# Patient Record
Sex: FEMALE | Race: WHITE | Employment: UNEMPLOYED | URBAN - METROPOLITAN AREA
[De-identification: names, ages, dates, MRNs, and addresses within clinical notes are randomized per-mention and may not be internally consistent; named-entity substitution may affect disease eponyms.]

---

## 2019-05-06 ENCOUNTER — APPOINTMENT (OUTPATIENT)
Dept: GENERAL RADIOLOGY | Age: 44
End: 2019-05-06
Payer: COMMERCIAL

## 2019-05-06 ENCOUNTER — TELEPHONE (OUTPATIENT)
Dept: PULMONOLOGY | Age: 44
End: 2019-05-06

## 2019-05-06 ENCOUNTER — HOSPITAL ENCOUNTER (EMERGENCY)
Age: 44
Discharge: HOME | End: 2019-05-06
Attending: EMERGENCY MEDICINE
Payer: COMMERCIAL

## 2019-05-06 VITALS
HEART RATE: 61 BPM | WEIGHT: 293 LBS | SYSTOLIC BLOOD PRESSURE: 119 MMHG | RESPIRATION RATE: 20 BRPM | HEIGHT: 72 IN | TEMPERATURE: 97.7 F | DIASTOLIC BLOOD PRESSURE: 77 MMHG | BODY MASS INDEX: 39.68 KG/M2 | OXYGEN SATURATION: 95 %

## 2019-05-06 DIAGNOSIS — R07.89 CHEST TIGHTNESS: ICD-10-CM

## 2019-05-06 DIAGNOSIS — J45.901 REACTIVE AIRWAY DISEASE WITH ACUTE EXACERBATION, UNSPECIFIED ASTHMA SEVERITY, UNSPECIFIED WHETHER PERSISTENT: Primary | ICD-10-CM

## 2019-05-06 DIAGNOSIS — R94.31 NONSPECIFIC ST-T WAVE ELECTROCARDIOGRAPHIC CHANGES: ICD-10-CM

## 2019-05-06 LAB
A/G RATIO: 1.2 (ref 1.1–2.2)
ALBUMIN SERPL-MCNC: 3.6 G/DL (ref 3.4–5)
ALP BLD-CCNC: 60 U/L (ref 40–129)
ALT SERPL-CCNC: 22 U/L (ref 10–40)
ANION GAP SERPL CALCULATED.3IONS-SCNC: 12 MMOL/L (ref 3–16)
AST SERPL-CCNC: 13 U/L (ref 15–37)
BASOPHILS ABSOLUTE: 0.1 K/UL (ref 0–0.2)
BASOPHILS RELATIVE PERCENT: 0.7 %
BILIRUB SERPL-MCNC: <0.2 MG/DL (ref 0–1)
BUN BLDV-MCNC: 15 MG/DL (ref 7–20)
CALCIUM SERPL-MCNC: 8.8 MG/DL (ref 8.3–10.6)
CHLORIDE BLD-SCNC: 100 MMOL/L (ref 99–110)
CO2: 24 MMOL/L (ref 21–32)
CREAT SERPL-MCNC: 1 MG/DL (ref 0.6–1.1)
D DIMER: <200 NG/ML DDU (ref 0–229)
EKG ATRIAL RATE: 65 BPM
EKG DIAGNOSIS: NORMAL
EKG P AXIS: 31 DEGREES
EKG P-R INTERVAL: 144 MS
EKG Q-T INTERVAL: 404 MS
EKG QRS DURATION: 108 MS
EKG QTC CALCULATION (BAZETT): 420 MS
EKG R AXIS: -7 DEGREES
EKG T AXIS: 33 DEGREES
EKG VENTRICULAR RATE: 65 BPM
EOSINOPHILS ABSOLUTE: 0.2 K/UL (ref 0–0.6)
EOSINOPHILS RELATIVE PERCENT: 2.5 %
GFR AFRICAN AMERICAN: >60
GFR NON-AFRICAN AMERICAN: 60
GLOBULIN: 2.9 G/DL
GLUCOSE BLD-MCNC: 117 MG/DL (ref 70–99)
HCG QUALITATIVE: NEGATIVE
HCT VFR BLD CALC: 38.2 % (ref 36–48)
HEMOGLOBIN: 13.4 G/DL (ref 12–16)
LYMPHOCYTES ABSOLUTE: 3 K/UL (ref 1–5.1)
LYMPHOCYTES RELATIVE PERCENT: 30.9 %
MCH RBC QN AUTO: 33 PG (ref 26–34)
MCHC RBC AUTO-ENTMCNC: 35 G/DL (ref 31–36)
MCV RBC AUTO: 94.4 FL (ref 80–100)
MONOCYTES ABSOLUTE: 0.5 K/UL (ref 0–1.3)
MONOCYTES RELATIVE PERCENT: 5 %
NEUTROPHILS ABSOLUTE: 5.9 K/UL (ref 1.7–7.7)
NEUTROPHILS RELATIVE PERCENT: 60.9 %
PDW BLD-RTO: 12.2 % (ref 12.4–15.4)
PLATELET # BLD: 250 K/UL (ref 135–450)
PMV BLD AUTO: 7.8 FL (ref 5–10.5)
POTASSIUM SERPL-SCNC: 3.5 MMOL/L (ref 3.5–5.1)
PRO-BNP: 225 PG/ML (ref 0–124)
RBC # BLD: 4.04 M/UL (ref 4–5.2)
SODIUM BLD-SCNC: 136 MMOL/L (ref 136–145)
TOTAL PROTEIN: 6.5 G/DL (ref 6.4–8.2)
TROPONIN: <0.01 NG/ML
WBC # BLD: 9.7 K/UL (ref 4–11)

## 2019-05-06 PROCEDURE — 84484 ASSAY OF TROPONIN QUANT: CPT

## 2019-05-06 PROCEDURE — 83880 ASSAY OF NATRIURETIC PEPTIDE: CPT

## 2019-05-06 PROCEDURE — 99285 EMERGENCY DEPT VISIT HI MDM: CPT

## 2019-05-06 PROCEDURE — 94640 AIRWAY INHALATION TREATMENT: CPT

## 2019-05-06 PROCEDURE — 71046 X-RAY EXAM CHEST 2 VIEWS: CPT

## 2019-05-06 PROCEDURE — 93010 ELECTROCARDIOGRAM REPORT: CPT | Performed by: INTERNAL MEDICINE

## 2019-05-06 PROCEDURE — 6370000000 HC RX 637 (ALT 250 FOR IP): Performed by: EMERGENCY MEDICINE

## 2019-05-06 PROCEDURE — 96365 THER/PROPH/DIAG IV INF INIT: CPT

## 2019-05-06 PROCEDURE — 93005 ELECTROCARDIOGRAM TRACING: CPT | Performed by: EMERGENCY MEDICINE

## 2019-05-06 PROCEDURE — 84703 CHORIONIC GONADOTROPIN ASSAY: CPT

## 2019-05-06 PROCEDURE — 85025 COMPLETE CBC W/AUTO DIFF WBC: CPT

## 2019-05-06 PROCEDURE — 96375 TX/PRO/DX INJ NEW DRUG ADDON: CPT

## 2019-05-06 PROCEDURE — 6360000002 HC RX W HCPCS: Performed by: EMERGENCY MEDICINE

## 2019-05-06 PROCEDURE — 2500000003 HC RX 250 WO HCPCS: Performed by: EMERGENCY MEDICINE

## 2019-05-06 PROCEDURE — 80053 COMPREHEN METABOLIC PANEL: CPT

## 2019-05-06 PROCEDURE — 85379 FIBRIN DEGRADATION QUANT: CPT

## 2019-05-06 PROCEDURE — 94644 CONT INHLJ TX 1ST HOUR: CPT

## 2019-05-06 RX ORDER — ALBUTEROL SULFATE 90 UG/1
AEROSOL, METERED RESPIRATORY (INHALATION)
Qty: 1 INHALER | Refills: 1 | Status: SHIPPED | OUTPATIENT
Start: 2019-05-06 | End: 2019-07-22 | Stop reason: SDUPTHER

## 2019-05-06 RX ORDER — PREDNISONE 20 MG/1
60 TABLET ORAL ONCE
Status: COMPLETED | OUTPATIENT
Start: 2019-05-06 | End: 2019-05-06

## 2019-05-06 RX ORDER — MAGNESIUM SULFATE 1 G/100ML
1 INJECTION INTRAVENOUS ONCE
Status: COMPLETED | OUTPATIENT
Start: 2019-05-06 | End: 2019-05-06

## 2019-05-06 RX ORDER — IPRATROPIUM BROMIDE AND ALBUTEROL SULFATE 2.5; .5 MG/3ML; MG/3ML
3 SOLUTION RESPIRATORY (INHALATION) ONCE
Status: COMPLETED | OUTPATIENT
Start: 2019-05-06 | End: 2019-05-06

## 2019-05-06 RX ADMIN — FAMOTIDINE 20 MG: 10 INJECTION, SOLUTION INTRAVENOUS at 07:58

## 2019-05-06 RX ADMIN — MAGNESIUM SULFATE HEPTAHYDRATE 1 G: 1 INJECTION, SOLUTION INTRAVENOUS at 07:58

## 2019-05-06 RX ADMIN — IPRATROPIUM BROMIDE AND ALBUTEROL SULFATE 3 AMPULE: .5; 3 SOLUTION RESPIRATORY (INHALATION) at 06:04

## 2019-05-06 RX ADMIN — PREDNISONE 60 MG: 20 TABLET ORAL at 06:46

## 2019-05-06 ASSESSMENT — PAIN SCALES - GENERAL
PAINLEVEL_OUTOF10: 3
PAINLEVEL_OUTOF10: 7
PAINLEVEL_OUTOF10: 4
PAINLEVEL_OUTOF10: 4

## 2019-05-06 ASSESSMENT — PAIN DESCRIPTION - DESCRIPTORS
DESCRIPTORS: TIGHTNESS
DESCRIPTORS: TIGHTNESS

## 2019-05-06 ASSESSMENT — ENCOUNTER SYMPTOMS
COLOR CHANGE: 0
ABDOMINAL PAIN: 0
COUGH: 1
CHEST TIGHTNESS: 1
VOMITING: 1
BACK PAIN: 0
DIARRHEA: 0
WHEEZING: 1
STRIDOR: 0
SHORTNESS OF BREATH: 1
NAUSEA: 1
CONSTIPATION: 0

## 2019-05-06 ASSESSMENT — PAIN DESCRIPTION - LOCATION
LOCATION: CHEST
LOCATION: ABDOMEN
LOCATION: BACK;CHEST
LOCATION: CHEST

## 2019-05-06 ASSESSMENT — PAIN SCALES - WONG BAKER
WONGBAKER_NUMERICALRESPONSE: 4
WONGBAKER_NUMERICALRESPONSE: 4

## 2019-05-06 NOTE — TELEPHONE ENCOUNTER
----- Message from Danie Lawson MD sent at 5/6/2019 11:55 AM EDT -----      ----- Message -----  From: Bucky Siddiqui MD  Sent: 5/6/2019   9:45 AM  To: Danie Lawson MD

## 2019-05-06 NOTE — ED PROVIDER NOTES
[facility]  ED  EMERGENCY DEPARTMENT ENCOUNTER        Pt Name: Brittany Nye  MRN: [id number]  [place] 1975  Date of evaluation: 5/6/2019  Provider: Ac Ahmadi MD  PCP: Trang Mckee Dr       Chief Complaint   Patient presents with    Asthma     started 0130 but was bad last week and was seen  on fri with hhn and better then. didn't get nebulizer on fri, but has rx for it       HISTORY OFPRESENT ILLNESS   (Location/Symptom, Timing/Onset, Context/Setting, Quality, Duration, Modifying Factors,Severity)  Note limiting factors. Brittany Nye is a 38 y.o. female presenting today due to concern for worsening wheezing and cough for the last 4 days after receiving breathing treatments and steroids initially at an urgent care and at that time improving 4 days ago but then last night worsening again to the point where she had trouble breathing. She also states over the last 6 weeks she has gained 30 pounds with increased bilateral leg swelling. Denies any actual chest pain but does have tightness. She does occasionally vomit. No abdominal pain. No fever or chills. She states she has seen multiple specialists for her waking over the last year including endocrinology but is unsure what is causing it, but again the 30 pound weight gain is a significant change over the last few weeks. She does smoke cigarettes but is trying to cut back. Her main concern is her trouble breathing but by the time I saw her she had received breathing treatments and was starting to feel better although still complaining of some chest tightness. Both of her legs are swollen. No history of blood clots. REVIEW OF SYSTEMS    (2-9 systems for level 4, 10 or more for level 5)     Review of Systems   Constitutional: Negative for chills, diaphoresis, fatigue and fever. HENT: Negative for congestion. Respiratory: Positive for cough, chest tightness, shortness of breath and wheezing. Negative for stridor. Cardiovascular: Positive for leg swelling (bilateral). Negative for chest pain. Gastrointestinal: Positive for nausea and vomiting (chronic issue). Negative for abdominal pain, constipation and diarrhea. Genitourinary: Negative for flank pain. Musculoskeletal: Negative for back pain and neck pain. Skin: Negative for color change. Neurological: Negative for weakness, light-headedness, numbness and headaches. Psychiatric/Behavioral: Negative for confusion. Positives and Pertinent negatives as per HPI. PASTMEDICAL HISTORY     Past Medical History:   Diagnosis Date    Asthma     COPD with acute exacerbation ([facility] [address] 6/30/2015    Endometriosis     Fibromyalgia     Gastroparesis     GERD (gastroesophageal reflux disease)     Irritable bowel syndrome     Migraines     Osteoarthritis     Psychiatric problem          SURGICAL HISTORY       Past Surgical History:   Procedure Laterality Date    COLONOSCOPY      DILATATION, ESOPHAGUS      ENDOMETRIAL ABLATION      ENDOSCOPY, COLON, DIAGNOSTIC           CURRENT MEDICATIONS       Discharge Medication List as of 5/6/2019  9:23 AM      CONTINUE these medications which have NOT CHANGED    Details   fluticasone (FLOVENT HFA) 110 MCG/ACT inhaler Inhale 2 puffs into the lungs 2 times daily, Disp-1 Inhaler, R-0      acyclovir (ZOVIRAX) 400 MG tablet Take 400 mg by mouth daily. ALLERGIES     Compazine [prochlorperazine]; Iv dye [iodides]; Toradol [ketorolac tromethamine];  Gluten meal; Milk protein; and Soy protein [place] oil]    FAMILY HISTORY       Family History   Problem Relation Age of Onset    Arthritis Mother     Depression Mother     Mental Illness Mother     Arthritis Father     High Blood Pressure Maternal Grandmother     Cancer Maternal Grandfather     High Blood Pressure Maternal Grandfather     High Blood Pressure Paternal Grandmother     High Blood Pressure Paternal Grandfather SOCIAL HISTORY       Social History     Socioeconomic History    Marital status:      Spouse name: None    Number of children: None    Years of education: None    Highest education level: None   Occupational History    None   Social Needs    Financial resource strain: None    Food insecurity:     Worry: None     Inability: None    Transportation needs:     Medical: None     Non-medical: None   Tobacco Use    Smoking status: Heavy Tobacco Smoker     Packs/day: 1.00     Start date: 6/29/1995    Smokeless tobacco: Never Used   Substance and Sexual Activity    Alcohol use: Yes     Comment: monthly    Drug use: No    Sexual activity: Not Currently     Partners: Male   Lifestyle    Physical activity:     Days per week: None     Minutes per session: None    Stress: None   Relationships    Social connections:     Talks on phone: None     Gets together: None     Attends Hoahaoism service: None     Active member of club or organization: None     Attends meetings of clubs or organizations: None     Relationship status: None    Intimate partner violence:     Fear of current or ex partner: None     Emotionally abused: None     Physically abused: None     Forced sexual activity: None   Other Topics Concern    None   Social History Narrative    None       SCREENINGS                PHYSICAL EXAM    (up to 7 for level 4, 8 or more for level 5)     ED Triage Vitals [05/06/19 0556]   BP Temp Temp Source Pulse Resp SpO2 Height Weight   (!) 157/92 97.7 °F (36.5 °C) Oral 109 18 98 % 6' (1.829 m) (!) 340 lb (154.2 kg)       Physical Exam   Constitutional: She is oriented to person, place, and time. She appears well-developed and well-nourished. She is active and cooperative. Non-toxic appearance. She does not have a sickly appearance. She does not appear ill. No distress. HENT:   Head: Normocephalic and atraumatic. Eyes: Right eye exhibits no discharge. Left eye exhibits no discharge.    Neck: Normal Performed at:  [address], [facility]   Phone [phone]   COMPREHENSIVE METABOLIC PANEL - Abnormal; Notable for the following components:    Glucose 117 (*)     GFR Non- 60 (*)     AST 13 (*)     All other components within normal limits    Narrative:     Performed at:  [address] [place] [address], [facility]   Phone [phone] - Abnormal; Notable for the following components:    Pro- (*)     All other components within normal limits    Narrative:     Performed at:  [address], [facility]   Phone [phone]   TROPONIN    Narrative:     Performed at:  [address] [place] [address], [facility]   Phone [phone]   D-DIMER, QUANTITATIVE    Narrative:     Performed at:  [address] [place] [address], [facility]   Phone [phone]   HCG, SERUM, QUALITATIVE    Narrative:     Performed at:  [address] [place] [address], [facility]   Phone [phone]       All other labs were within normal range or not returned asof this dictation. EKG: All EKG's are interpreted by the Emergency Department Physician who either signs or Co-signs this chart in the absence of a cardiologist.    The Ekg interpreted by me shows  normal sinus rhythm and sinus arrhythmia with a rate of 65  Axis is   Normal  QTc is  normal  Intervals and Durations are unremarkable.       ST Segments: no acute change and nonspecific changes   No significant change from prior EKG dated - 3/12/15  No STEMI, incomplete RBBB present but old, q waves present in anterolateral leads appear new            RADIOLOGY:   Non-plain film images such as CT, Ultrasound and MRI are read by the radiologist. cardiology. She was feeling better while in the emergency department. She also knows to follow up with pulmonology for her asthma versus COPD for further assessment. She understands if she has any worsening trouble breathing, development of chest pain with shortness of breath, other concerning symptoms to return to the emergency department, but otherwise see her primary doctor or pulmonology within the next 2-3 days and cardiology within the next 1-2 weeks. She plans to see her primary doctor in 3 days. She was well-appearing and in no acute distress at time of discharge and she and her  felt comfortable with this plan. The patient tolerated their visit well. The patient and / or the family were informed of the results of any tests, a time was given to answer questions. FINAL IMPRESSION      1. Reactive airway disease with acute exacerbation, unspecified asthma severity, unspecified whether persistent    2. Chest tightness    3.  Nonspecific ST-T wave electrocardiographic changes          DISPOSITION/PLAN   DISPOSITION Decision To Discharge 05/06/2019 09:18:54 AM      PATIENT REFERRED TO:  [facility]  ED  [address]  [address] [facility] [address] [facility]  Go to   If symptoms worsen    Connie Cabrera [address] [address]  [phone]      As needed    [facility]  [facility]   [address]  [address] [place] [address] Road  [phone]    In 1 week  For outpatient echocardiogram due to EKG changes    Alejandra West MD  [address]  [phone]    Schedule an appointment as soon as possible for a visit in 3 days  For futher evaluation due to concern for wheezing and concern for COPD      DISCHARGEMEDICATIONS:  Discharge Medication List as of 5/6/2019  9:23 AM          DISCONTINUED MEDICATIONS:  Discharge Medication List as of 5/6/2019  9:23 AM                 (Please note that portions of this note were completed with a voicerecognition program.  Efforts were made to edit the dictations but occasionally words are mis-transcribed.)    Moe Olsen MD (electronically signed)            Moe Olsen MD  05/06/19 2356

## 2019-05-07 ENCOUNTER — HOSPITAL ENCOUNTER (OUTPATIENT)
Age: 44
Discharge: HOME | End: 2019-05-07
Payer: COMMERCIAL

## 2019-05-07 ENCOUNTER — OFFICE VISIT (OUTPATIENT)
Dept: PULMONOLOGY | Age: 44
End: 2019-05-07
Payer: COMMERCIAL

## 2019-05-07 VITALS
OXYGEN SATURATION: 98 % | HEART RATE: 84 BPM | WEIGHT: 293 LBS | DIASTOLIC BLOOD PRESSURE: 84 MMHG | BODY MASS INDEX: 39.68 KG/M2 | SYSTOLIC BLOOD PRESSURE: 140 MMHG | HEIGHT: 72 IN | RESPIRATION RATE: 16 BRPM

## 2019-05-07 DIAGNOSIS — J45.51 ASTHMA, SEVERE PERSISTENT, POORLY-CONTROLLED, WITH ACUTE EXACERBATION: Primary | ICD-10-CM

## 2019-05-07 DIAGNOSIS — J41.0 CHRONIC BRONCHITIS, SIMPLE (HCC): ICD-10-CM

## 2019-05-07 DIAGNOSIS — F17.200 TOBACCO DEPENDENCE: ICD-10-CM

## 2019-05-07 DIAGNOSIS — J45.51 ASTHMA, SEVERE PERSISTENT, POORLY-CONTROLLED, WITH ACUTE EXACERBATION: ICD-10-CM

## 2019-05-07 PROCEDURE — 82785 ASSAY OF IGE: CPT

## 2019-05-07 PROCEDURE — 82104 ALPHA-1-ANTITRYPSIN PHENO: CPT

## 2019-05-07 PROCEDURE — 82103 ALPHA-1-ANTITRYPSIN TOTAL: CPT

## 2019-05-07 PROCEDURE — G8417 CALC BMI ABV UP PARAM F/U: HCPCS | Performed by: INTERNAL MEDICINE

## 2019-05-07 PROCEDURE — G8427 DOCREV CUR MEDS BY ELIG CLIN: HCPCS | Performed by: INTERNAL MEDICINE

## 2019-05-07 PROCEDURE — 99244 OFF/OP CNSLTJ NEW/EST MOD 40: CPT | Performed by: INTERNAL MEDICINE

## 2019-05-07 PROCEDURE — G8926 SPIRO NO PERF OR DOC: HCPCS | Performed by: INTERNAL MEDICINE

## 2019-05-07 PROCEDURE — 86003 ALLG SPEC IGE CRUDE XTRC EA: CPT

## 2019-05-07 PROCEDURE — 3023F SPIROM DOC REV: CPT | Performed by: INTERNAL MEDICINE

## 2019-05-07 RX ORDER — BUDESONIDE AND FORMOTEROL FUMARATE DIHYDRATE 160; 4.5 UG/1; UG/1
2 AEROSOL RESPIRATORY (INHALATION) 2 TIMES DAILY
Qty: 1 INHALER | Refills: 11 | Status: ON HOLD | OUTPATIENT
Start: 2019-05-07 | End: 2019-07-30

## 2019-05-07 RX ORDER — BUDESONIDE AND FORMOTEROL FUMARATE DIHYDRATE 160; 4.5 UG/1; UG/1
2 AEROSOL RESPIRATORY (INHALATION) 2 TIMES DAILY
Qty: 1 INHALER | Refills: 0 | COMMUNITY
Start: 2019-05-07 | End: 2019-05-17 | Stop reason: CLARIF

## 2019-05-07 RX ORDER — CITALOPRAM 40 MG/1
40 TABLET ORAL DAILY
COMMUNITY
End: 2019-05-14 | Stop reason: SDUPTHER

## 2019-05-07 ASSESSMENT — ENCOUNTER SYMPTOMS
CONSTIPATION: 0
EYE DISCHARGE: 0
EYE ITCHING: 0
STRIDOR: 0
DIARRHEA: 0
CHEST TIGHTNESS: 1
EYE PAIN: 0
SORE THROAT: 0
SHORTNESS OF BREATH: 1
COUGH: 1
VOICE CHANGE: 0
CHOKING: 0
ABDOMINAL PAIN: 0
WHEEZING: 1

## 2019-05-07 NOTE — PROGRESS NOTES
Pulmonary Outpatient Note   Jc Carmichael MD       5/7/2019    Chief Complaint:  New Patient and Asthma     HPI:   38y.o. year old female here for further evaluation of asthma/COPD. Has had asthma since her teenage years, well controlled with prn albuterol and Symbicort she was previously on. In the past 2 years she has had increased shortness of breath, unable to afford her Symbicort and was only on albuterol. Describes acute worsening in the past week with cough, congestion, shortness of breath at rest with minimal exertion it is triggered, and chest tightness. Was seen in the ED and treated for an acute exac with prednisone. Referred here for further evaluation.        Past Medical History:   Diagnosis Date    Asthma     COPD with acute exacerbation ([facility] [address] 6/30/2015    Endometriosis     Fibromyalgia     Gastroparesis     GERD (gastroesophageal reflux disease)     Irritable bowel syndrome     Migraines     Osteoarthritis     Psychiatric problem        Past Surgical History:   Procedure Laterality Date    COLONOSCOPY      DILATATION, ESOPHAGUS      ENDOMETRIAL ABLATION      ENDOSCOPY, COLON, DIAGNOSTIC         Social History     Tobacco Use    Smoking status: Heavy Tobacco Smoker     Packs/day: 1.00     Start date: 6/29/1995    Smokeless tobacco: Never Used   Substance Use Topics    Alcohol use: Yes     Comment: monthly          Family History   Problem Relation Age of Onset    Arthritis Mother     Depression Mother     Mental Illness Mother     Arthritis Father     High Blood Pressure Maternal Grandmother     Cancer Maternal Grandfather     High Blood Pressure Maternal Grandfather     High Blood Pressure Paternal Grandmother     High Blood Pressure Paternal Grandfather          Current Outpatient Medications:     citalopram (CELEXA) 40 MG tablet, Take 40 mg by mouth daily, Disp: , Rfl:     budesonide-formoterol (SYMBICORT) 160-4.5 MCG/ACT AERO, Inhale 2 puffs into the lungs 2 times daily, Disp: 1 Inhaler, Rfl: 11    albuterol sulfate HFA (PROAIR HFA) 108 (90 Base) MCG/ACT inhaler, Use 1-2 puffs every 4 hours while awake for 3 days then PRN wheezing/cough. Dispense with SPACER and Instruct on use. May sub Ventolin or Proventil as needed per Insurance., Disp: 1 Inhaler, Rfl: 1    acyclovir (ZOVIRAX) 400 MG tablet, Take 400 mg by mouth daily. , Disp: , Rfl:     fluticasone (FLOVENT HFA) 110 MCG/ACT inhaler, Inhale 2 puffs into the lungs 2 times daily, Disp: 1 Inhaler, Rfl: 0    Compazine [prochlorperazine]; Iv dye [iodides]; Toradol [ketorolac tromethamine]; Gluten meal; Milk protein; and Soy protein [place] oil]    Vitals:    05/07/19 0938   BP: (!) 140/84   Site: Right Upper Arm   Position: Sitting   Pulse: 84   Resp: 16   SpO2: 98%   Weight: (!) 336 lb (152.4 kg)   Height: 6' (1.829 m)       Review of Systems   Constitutional: Negative for chills, fever and unexpected weight change. HENT: Negative for mouth sores, sore throat and voice change. Eyes: Negative for pain, discharge and itching. Respiratory: Positive for cough, chest tightness, shortness of breath and wheezing. Negative for choking and stridor. Cardiovascular: Negative for chest pain, palpitations and leg swelling. Gastrointestinal: Negative for abdominal pain, constipation and diarrhea. Endocrine: Negative for cold intolerance, heat intolerance and polydipsia. Genitourinary: Negative for dysuria, frequency and hematuria. Musculoskeletal: Negative for gait problem, joint swelling and neck stiffness. Neurological: Negative for dizziness, numbness and headaches. Psychiatric/Behavioral: Negative for agitation, confusion and hallucinations. Physical Exam   Constitutional: She appears well-developed and well-nourished. No distress. HENT:   Head: Normocephalic and atraumatic. Mouth/Throat: Oropharynx is clear and moist. No oropharyngeal exudate.    Eyes: Pupils are equal, round, and reactive to

## 2019-05-07 NOTE — PATIENT INSTRUCTIONS
Labs to be completed today       PFT TEST  You have been scheduled for a Pulmonary Function Test on 5-8-19 at 8:30 am at [address]:  Nothing my mouth 1 hour prior to test (water only is OK). No smoking or inhalers 4 hours prior to test unless directed differently by the physician. Please PRE-REGISTER at [phone] option 2, option 2,prior to your test date. Also, please remember to arrive 30 minutes prior to testing unless otherwise instructed.

## 2019-05-08 ENCOUNTER — OFFICE VISIT (OUTPATIENT)
Dept: INTERNAL MEDICINE CLINIC | Age: 44
End: 2019-05-08
Payer: COMMERCIAL

## 2019-05-08 VITALS
HEART RATE: 66 BPM | SYSTOLIC BLOOD PRESSURE: 132 MMHG | HEIGHT: 72 IN | DIASTOLIC BLOOD PRESSURE: 78 MMHG | WEIGHT: 293 LBS | OXYGEN SATURATION: 99 % | BODY MASS INDEX: 39.68 KG/M2

## 2019-05-08 DIAGNOSIS — R51.9 HEADACHE DISORDER: ICD-10-CM

## 2019-05-08 DIAGNOSIS — E66.01 CLASS 3 SEVERE OBESITY DUE TO EXCESS CALORIES WITHOUT SERIOUS COMORBIDITY WITH BODY MASS INDEX (BMI) OF 45.0 TO 49.9 IN ADULT (HCC): ICD-10-CM

## 2019-05-08 DIAGNOSIS — K58.0 IRRITABLE BOWEL SYNDROME WITH DIARRHEA: ICD-10-CM

## 2019-05-08 DIAGNOSIS — M79.602 LEFT ARM PAIN: ICD-10-CM

## 2019-05-08 DIAGNOSIS — Z00.00 ANNUAL PHYSICAL EXAM: Primary | ICD-10-CM

## 2019-05-08 DIAGNOSIS — F41.9 ANXIETY AND DEPRESSION: ICD-10-CM

## 2019-05-08 DIAGNOSIS — J30.89 ENVIRONMENTAL AND SEASONAL ALLERGIES: ICD-10-CM

## 2019-05-08 DIAGNOSIS — K21.9 GASTROESOPHAGEAL REFLUX DISEASE WITHOUT ESOPHAGITIS: ICD-10-CM

## 2019-05-08 DIAGNOSIS — Z23 NEED FOR STREPTOCOCCUS PNEUMONIAE VACCINATION: ICD-10-CM

## 2019-05-08 DIAGNOSIS — M15.9 PRIMARY OSTEOARTHRITIS INVOLVING MULTIPLE JOINTS: ICD-10-CM

## 2019-05-08 DIAGNOSIS — R73.9 HYPERGLYCEMIA: ICD-10-CM

## 2019-05-08 DIAGNOSIS — F32.A ANXIETY AND DEPRESSION: ICD-10-CM

## 2019-05-08 PROBLEM — M12.9 ARTHRITIS, MULTIPLE JOINT INVOLVEMENT: Status: ACTIVE | Noted: 2019-05-08

## 2019-05-08 PROCEDURE — G8427 DOCREV CUR MEDS BY ELIG CLIN: HCPCS | Performed by: INTERNAL MEDICINE

## 2019-05-08 PROCEDURE — 96160 PT-FOCUSED HLTH RISK ASSMT: CPT | Performed by: INTERNAL MEDICINE

## 2019-05-08 PROCEDURE — 4004F PT TOBACCO SCREEN RCVD TLK: CPT | Performed by: INTERNAL MEDICINE

## 2019-05-08 PROCEDURE — 90471 IMMUNIZATION ADMIN: CPT | Performed by: INTERNAL MEDICINE

## 2019-05-08 PROCEDURE — G8417 CALC BMI ABV UP PARAM F/U: HCPCS | Performed by: INTERNAL MEDICINE

## 2019-05-08 PROCEDURE — 99386 PREV VISIT NEW AGE 40-64: CPT | Performed by: INTERNAL MEDICINE

## 2019-05-08 PROCEDURE — 90732 PPSV23 VACC 2 YRS+ SUBQ/IM: CPT | Performed by: INTERNAL MEDICINE

## 2019-05-08 PROCEDURE — 99205 OFFICE O/P NEW HI 60 MIN: CPT | Performed by: INTERNAL MEDICINE

## 2019-05-08 RX ORDER — PANTOPRAZOLE SODIUM 40 MG/1
40 TABLET, DELAYED RELEASE ORAL
Qty: 30 TABLET | Refills: 0 | Status: SHIPPED | OUTPATIENT
Start: 2019-05-08 | End: 2019-05-20

## 2019-05-08 RX ORDER — MONTELUKAST SODIUM 10 MG/1
10 TABLET ORAL DAILY
Qty: 30 TABLET | Refills: 3 | Status: SHIPPED | OUTPATIENT
Start: 2019-05-08 | End: 2020-07-09

## 2019-05-08 RX ORDER — MELOXICAM 15 MG/1
15 TABLET ORAL DAILY
Qty: 30 TABLET | Refills: 0 | Status: SHIPPED | OUTPATIENT
Start: 2019-05-08 | End: 2019-05-21 | Stop reason: SDUPTHER

## 2019-05-08 RX ORDER — TOPIRAMATE 50 MG/1
50 TABLET, FILM COATED ORAL DAILY
Qty: 30 TABLET | Refills: 0 | Status: SHIPPED | OUTPATIENT
Start: 2019-05-08 | End: 2019-05-21 | Stop reason: DRUGHIGH

## 2019-05-08 RX ORDER — LEVOCETIRIZINE DIHYDROCHLORIDE 5 MG/1
5 TABLET, FILM COATED ORAL NIGHTLY
COMMUNITY
End: 2019-05-21 | Stop reason: SDUPTHER

## 2019-05-08 RX ORDER — DICYCLOMINE HYDROCHLORIDE 10 MG/1
CAPSULE ORAL
Qty: 120 CAPSULE | Refills: 0 | Status: SHIPPED | OUTPATIENT
Start: 2019-05-08 | End: 2020-07-09

## 2019-05-08 RX ORDER — FLUTICASONE PROPIONATE 50 MCG
2 SPRAY, SUSPENSION (ML) NASAL DAILY
Qty: 1 BOTTLE | Refills: 0 | Status: SHIPPED | OUTPATIENT
Start: 2019-05-08 | End: 2019-05-21 | Stop reason: SDUPTHER

## 2019-05-08 RX ORDER — SUMATRIPTAN 100 MG/1
100 TABLET, FILM COATED ORAL
Qty: 9 TABLET | Refills: 0 | Status: SHIPPED | OUTPATIENT
Start: 2019-05-08 | End: 2019-08-15 | Stop reason: SDUPTHER

## 2019-05-08 RX ORDER — BUPROPION HYDROCHLORIDE 150 MG/1
150 TABLET ORAL EVERY MORNING
Qty: 30 TABLET | Refills: 0 | Status: SHIPPED | OUTPATIENT
Start: 2019-05-08 | End: 2019-05-21 | Stop reason: SDUPTHER

## 2019-05-08 ASSESSMENT — PATIENT HEALTH QUESTIONNAIRE - PHQ9
SUM OF ALL RESPONSES TO PHQ9 QUESTIONS 1 & 2: 6
10. IF YOU CHECKED OFF ANY PROBLEMS, HOW DIFFICULT HAVE THESE PROBLEMS MADE IT FOR YOU TO DO YOUR WORK, TAKE CARE OF THINGS AT HOME, OR GET ALONG WITH OTHER PEOPLE: 1
2. FEELING DOWN, DEPRESSED OR HOPELESS: 3
1. LITTLE INTEREST OR PLEASURE IN DOING THINGS: 3
6. FEELING BAD ABOUT YOURSELF - OR THAT YOU ARE A FAILURE OR HAVE LET YOURSELF OR YOUR FAMILY DOWN: 3
SUM OF ALL RESPONSES TO PHQ QUESTIONS 1-9: 19
5. POOR APPETITE OR OVEREATING: 1
3. TROUBLE FALLING OR STAYING ASLEEP: 3
SUM OF ALL RESPONSES TO PHQ QUESTIONS 1-9: 19
4. FEELING TIRED OR HAVING LITTLE ENERGY: 3
9. THOUGHTS THAT YOU WOULD BE BETTER OFF DEAD, OR OF HURTING YOURSELF: 0
8. MOVING OR SPEAKING SO SLOWLY THAT OTHER PEOPLE COULD HAVE NOTICED. OR THE OPPOSITE, BEING SO FIGETY OR RESTLESS THAT YOU HAVE BEEN MOVING AROUND A LOT MORE THAN USUAL: 3
7. TROUBLE CONCENTRATING ON THINGS, SUCH AS READING THE NEWSPAPER OR WATCHING TELEVISION: 0

## 2019-05-08 NOTE — PATIENT INSTRUCTIONS
Topamax 50 mg: start with 1/2 pill in the evening for 1 week, then increase to 1 pill at night for 1 week and continue to increase by 1/2 a pill every week up to 1 pill twice a day until headache goes away. Can take a Vit C if have numbness and tingling and these side effects usually will go away once you're used to the medicine. If the numbness/tingling gets bad, go back down to the last dose you were on until you get used to it longer. You may notice a decrease in your appetite with the medication or sodas taste bad.

## 2019-05-08 NOTE — PROGRESS NOTES
[facility] Primary [facility]  History and Physical  Chance Ventura M.D. Sandra Nicolas  YOB: 1975    Date of Service:  5/8/2019    Chief Complaint:   Sandra Nicolas is a 38 y.o. female who presents for   Chief Complaint   Patient presents with    [facility]       HPI: Here for Annual Physical and Follow up of multiple complaints. She complains of diarrhea after eating for years and worse in the past 6 months. She's gained a lot of weight for the past year and denies eating much but she's unable to exercise due to arthritis pain. She complains of depression due to downturn in her health and chronic arthritis pain. She's been on Celexa for a long time from previous PCP. She complains of severe heartburn with acid reflux and have failed Nexium (made it worse) and other PPI without relief. She complains of pain in bilateral knee, ankles and feet for the past 10 years. Had negative w/u for RA since FH RA. She complains of headache behind her eyes and forehead almost daily for the past 6-8 months. She gets nauseas and photophobia. She just started on xyzal 5 mg qd from Urgent Care so not noticed improvement yet.       Lab Results   Component Value Date    LABMICR Yes 06/29/2015     Lab Results   Component Value Date     05/06/2019    K 3.5 05/06/2019     05/06/2019    CO2 24 05/06/2019    BUN 15 05/06/2019    CREATININE 1.0 05/06/2019    GLUCOSE 117 (H) 05/06/2019    CALCIUM 8.8 05/06/2019     No results found for: CHOL, TRIG, HDL, LDLCALC, LDLDIRECT  Lab Results   Component Value Date    ALT 22 05/06/2019    AST 13 (L) 05/06/2019     No results found for: TSH, T4FREE  Lab Results   Component Value Date    WBC 9.7 05/06/2019    HGB 13.4 05/06/2019    HCT 38.2 05/06/2019    MCV 94.4 05/06/2019     05/06/2019     Lab Results   Component Value Date    INR 0.88 01/31/2014      No results found for: PSA   No results found for: LABURIC     Wt Readings from Last 3 Encounters:   05/08/19 (!) 335 lb (152 kg)   05/07/19 (!) 336 lb (152.4 kg)   05/06/19 (!) 340 lb (154.2 kg)     BP Readings from Last 3 Encounters:   05/08/19 132/78   05/07/19 (!) 140/84   05/06/19 119/77       Patient Active Problem List   Diagnosis    COPD with acute exacerbation ([facility]    Bronchitis    Obesity    Smoker    Asthma       Allergies   Allergen Reactions    Compazine [Prochlorperazine] Anaphylaxis    Iv Dye [Iodides] Hives and Itching    Toradol [Ketorolac Tromethamine]      Outpatient Medications Marked as Taking for the 5/8/19 encounter (Office Visit) with Trisha Campbell MD   Medication Sig Dispense Refill    levocetirizine (XYZAL) 5 MG tablet Take 5 mg by mouth nightly      citalopram (CELEXA) 40 MG tablet Take 40 mg by mouth daily      budesonide-formoterol (SYMBICORT) 160-4.5 MCG/ACT AERO Inhale 2 puffs into the lungs 2 times daily 1 Inhaler 11    budesonide-formoterol (SYMBICORT) 160-4.5 MCG/ACT AERO Inhale 2 puffs into the lungs 2 times daily 1 Inhaler 0    albuterol sulfate HFA (PROAIR HFA) 108 (90 Base) MCG/ACT inhaler Use 1-2 puffs every 4 hours while awake for 3 days then PRN wheezing/cough. Dispense with SPACER and Instruct on use. May sub Ventolin or Proventil as needed per [employer]. 1 Inhaler 1    acyclovir (ZOVIRAX) 400 MG tablet Take 400 mg by mouth daily.          Past Medical History:   Diagnosis Date    Asthma     COPD with acute exacerbation ([facility] 6/30/2015    Endometriosis     Fibromyalgia     Gastroparesis     GERD (gastroesophageal reflux disease)     Irritable bowel syndrome     Migraines     Osteoarthritis     Psychiatric problem      Past Surgical History:   Procedure Laterality Date    COLONOSCOPY      DILATATION, ESOPHAGUS      ENDOMETRIAL ABLATION      ENDOSCOPY, COLON, DIAGNOSTIC      SALPINGO-OOPHORECTOMY Left 2009     Family History   Problem Relation Age of Onset    Arthritis Mother     Depression Mother     Mental Illness Mother    Tessie Ardon Cancer Mother         skin    Arthritis Father     High Blood Pressure Maternal Grandmother     Cancer Maternal Grandfather     High Blood Pressure Maternal Grandfather     High Blood Pressure Paternal Grandmother     High Blood Pressure Paternal Grandfather      Social History     Socioeconomic History    Marital status: Single     Spouse name: Not on file    Number of children: Not on file    Years of education: Not on file    Highest education level: Not on file   Occupational History    Occupation: unemployed   Social Needs    Financial resource strain: Not on file    Food insecurity:     Worry: Not on file     Inability: Not on file   Casmul needs:     Medical: Not on file     Non-medical: Not on file   Tobacco Use    Smoking status: Heavy Tobacco Smoker     Packs/day: 1.00     Start date: 6/29/1995    Smokeless tobacco: Never Used   Substance and Sexual Activity    Alcohol use: Yes     Comment: monthly    Drug use: No    Sexual activity: Not Currently     Partners: Male   Lifestyle    Physical activity:     Days per week: Not on file     Minutes per session: Not on file    Stress: Not on file   Relationships    Social connections:     Talks on phone: Not on file     Gets together: Not on file     Attends Amish service: Not on file     Active member of club or organization: Not on file     Attends meetings of clubs or organizations: Not on file     Relationship status: Not on file    Intimate partner violence:     Fear of current or ex partner: Not on file     Emotionally abused: Not on file     Physically abused: Not on file     Forced sexual activity: Not on file   Other Topics Concern    Not on file   Social History Narrative    Not on file       Review of Systems:  A comprehensive review of systems was negative except for what was noted in the HPI.      Physical Exam:   Vitals:    05/08/19 1427   BP: 132/78   Pulse: 66   SpO2: 99%   Weight: (!) 335 lb (152 kg)   Height: pantoprazole (PROTONIX) 40 MG tablet; Take 1 tablet by mouth every morning (before breakfast)  -     Caterina Collado MD, Gastroenterology, [facility]    Primary osteoarthritis involving multiple joints  -     Alejandra Russo MD, Rheumatology, [facility]  Start  meloxicam (MOBIC) 15 MG tablet; Take 1 tablet by mouth daily    Left arm pain  Start  meloxicam (MOBIC) 15 MG tablet; Take 1 tablet by mouth daily  -     Neri Grant MD, Orthopedic Surgery, [facility]    Headache disorder  Start  topiramate (TOPAMAX) 50 MG tablet; Take 1 tablet by mouth daily  Start SUMAtriptan (IMITREX) 100 MG tablet; Take 1 tablet by mouth once as needed for Migraine Repeat in 2 hrs, maximum 2 pills within 24hr    Environmental and seasonal allergies  Start fluticasone (FLONASE) 50 MCG/ACT nasal spray; 2 sprays by Each Nare route daily 2 Sprays in each nostril  Start montelukast (SINGULAIR) 10 MG tablet; Take 1 tablet by mouth daily    Irritable bowel syndrome with diarrhea  Start dicyclomine (BENTYL) 10 MG capsule; 1-2 po qac prn to prevent diarrhea    Hyperglycemia  -     Hemoglobin A1C; Future    Class 3 severe obesity due to excess calories without serious comorbidity with body mass index (BMI) of 45.0 to 49.9 in adult (HCC)  -     TSH without Reflex; Future  -     T4, Free; Future  -     T3, FREE; Future    Spent 60 mins with patient. Return 2 weeks on multiple issues and Karson Barraza for counseling.

## 2019-05-09 ENCOUNTER — TELEPHONE (OUTPATIENT)
Dept: PULMONOLOGY | Age: 44
End: 2019-05-09

## 2019-05-09 LAB
2000687N OAK TREE IGE: <0.1 KU/L
ALLERGEN ASPERGILLUS ALTERNATA IGE: <0.1 KU/L
ALLERGEN ASPERGILLUS FUMIGATUS IGE: <0.1 KU/L
ALLERGEN BERMUDA GRASS IGE: <0.1 KU/L
ALLERGEN BIRCH IGE: <0.1 KU/L
ALLERGEN CAT DANDER IGE: <0.1 KU/L
ALLERGEN COMMON SHORT RAGWEED IGE: <0.1 KU/L
ALLERGEN COTTONWOOD: <0.1 KU/L
ALLERGEN COW MILK IGE: <0.1 KU/L
ALLERGEN DOG DANDER IGE: <0.1 KU/L
ALLERGEN ELM IGE: <0.1 KU/L
ALLERGEN FUNGI/MOLD M.RACEMOSUS IGE: <0.1 KU/L
ALLERGEN GERMAN COCKROACH IGE: <0.1 KU/L
ALLERGEN HORMODENDRUM HORDEI IGE: <0.1 KU/L
ALLERGEN MAPLE/BOX ELDER IGE: <0.1 KU/L
ALLERGEN MITE DUST FARINAE IGE: <0.1 KU/L
ALLERGEN MITE DUST PTERONYSSINUS IGE: 0.21 KU/L
ALLERGEN MOUNTAIN CEDAR: <0.1 KU/L
ALLERGEN MOUSE EPITHELIA IGE: <0.1 KU/L
ALLERGEN PEANUT (F13) IGE: <0.1 KU/L
ALLERGEN PECAN TREE IGE: <0.1 KU/L
ALLERGEN PENICILLIUM NOTATUM: <0.1 KU/L
ALLERGEN ROUGH PIGWEED (W14) IGE: <0.1 KU/L
ALLERGEN RUSSIAN THISTLE IGE: <0.1 KU/L
ALLERGEN SEE NOTE: NORMAL
ALLERGEN SHEEP SORREL (W18) IGE: <0.1 KU/L
ALLERGEN TIMOTHY GRASS: <0.1 KU/L
ALLERGEN TREE SYCAMORE: <0.1 KU/L
ALLERGEN WALNUT TREE IGE: <0.1 KU/L
ALLERGEN WHITE MULBERRY TREE, IGE: <0.1 KU/L
ALLERGEN, TREE, WHITE ASH IGE: <0.1 KU/L
IGE: 31 KU/L

## 2019-05-10 LAB
ALPHA-1 ANTITRYPSIN PHENOTYPE: NORMAL
ALPHA-1 ANTITRYPSIN: 107 MG/DL (ref 90–200)

## 2019-05-14 RX ORDER — CITALOPRAM 40 MG/1
40 TABLET ORAL DAILY
Qty: 30 TABLET | Refills: 0 | Status: SHIPPED | OUTPATIENT
Start: 2019-05-14 | End: 2019-05-21 | Stop reason: SDUPTHER

## 2019-05-17 ENCOUNTER — INITIAL CONSULT (OUTPATIENT)
Dept: GASTROENTEROLOGY | Age: 44
End: 2019-05-17
Payer: COMMERCIAL

## 2019-05-17 ENCOUNTER — HOSPITAL ENCOUNTER (OUTPATIENT)
Age: 44
Discharge: HOME | End: 2019-05-17
Payer: COMMERCIAL

## 2019-05-17 VITALS
HEIGHT: 72 IN | WEIGHT: 293 LBS | BODY MASS INDEX: 39.68 KG/M2 | SYSTOLIC BLOOD PRESSURE: 130 MMHG | DIASTOLIC BLOOD PRESSURE: 78 MMHG

## 2019-05-17 DIAGNOSIS — E66.01 CLASS 3 SEVERE OBESITY DUE TO EXCESS CALORIES WITHOUT SERIOUS COMORBIDITY WITH BODY MASS INDEX (BMI) OF 45.0 TO 49.9 IN ADULT (HCC): ICD-10-CM

## 2019-05-17 DIAGNOSIS — R12 HEARTBURN: Primary | ICD-10-CM

## 2019-05-17 DIAGNOSIS — Z00.00 ANNUAL PHYSICAL EXAM: ICD-10-CM

## 2019-05-17 DIAGNOSIS — R73.9 HYPERGLYCEMIA: ICD-10-CM

## 2019-05-17 DIAGNOSIS — R19.7 DIARRHEA, UNSPECIFIED TYPE: ICD-10-CM

## 2019-05-17 LAB
CHOLESTEROL, TOTAL: 169 MG/DL (ref 0–199)
HDLC SERPL-MCNC: 52 MG/DL (ref 40–60)
LDL CHOLESTEROL CALCULATED: 66 MG/DL
T3 FREE: 2.6 PG/ML (ref 2.3–4.2)
T4 FREE: 1.1 NG/DL (ref 0.9–1.8)
TRIGL SERPL-MCNC: 254 MG/DL (ref 0–150)
TSH SERPL DL<=0.05 MIU/L-ACNC: 1.03 UIU/ML (ref 0.27–4.2)
VLDLC SERPL CALC-MCNC: 51 MG/DL

## 2019-05-17 PROCEDURE — 84443 ASSAY THYROID STIM HORMONE: CPT

## 2019-05-17 PROCEDURE — 83036 HEMOGLOBIN GLYCOSYLATED A1C: CPT

## 2019-05-17 PROCEDURE — G8417 CALC BMI ABV UP PARAM F/U: HCPCS | Performed by: INTERNAL MEDICINE

## 2019-05-17 PROCEDURE — 84439 ASSAY OF FREE THYROXINE: CPT

## 2019-05-17 PROCEDURE — G8427 DOCREV CUR MEDS BY ELIG CLIN: HCPCS | Performed by: INTERNAL MEDICINE

## 2019-05-17 PROCEDURE — 4004F PT TOBACCO SCREEN RCVD TLK: CPT | Performed by: INTERNAL MEDICINE

## 2019-05-17 PROCEDURE — 36415 COLL VENOUS BLD VENIPUNCTURE: CPT

## 2019-05-17 PROCEDURE — 84481 FREE ASSAY (FT-3): CPT

## 2019-05-17 PROCEDURE — 99204 OFFICE O/P NEW MOD 45 MIN: CPT | Performed by: INTERNAL MEDICINE

## 2019-05-17 PROCEDURE — 80061 LIPID PANEL: CPT

## 2019-05-17 RX ORDER — OMEPRAZOLE 40 MG/1
40 CAPSULE, DELAYED RELEASE ORAL 2 TIMES DAILY
Qty: 60 CAPSULE | Refills: 1 | Status: SHIPPED | OUTPATIENT
Start: 2019-05-17 | End: 2019-11-12 | Stop reason: SDUPTHER

## 2019-05-17 NOTE — PATIENT INSTRUCTIONS
Discussed IBS-D treatment options and theories including:    Irritable bowel syndrome is defined by criteria referred to as the [place] Criteria. It is distinct from diarrhea from other causes or functional diarrhea in that there is abdominal pain    Bacterial problem:  1)Prebiotics may be beneficial.  This includes soluble fiber such as benefiber, fibersure, citrucele, etc.  Medical foods also fall into this category including Entergam (may stimulate a healthy gi immune system), IB-guard. 2)Probiotics such as Align or Culturele 1 twice a day. Would not try for more then a month. These are not supported by the literature. 3)Antibiotics. While many may help, the only currently approved is Xifaxan, is non-absorbable thus acting only on the gut bacteria. The advantage of this as an initial treatment is that it is short term and may treat a similar and hard to test for condition called SIBO (small intestinal bacterial overgrowth). Unfortunately the number needed to treat to provide benefit in IBS with diarrhea is 10. Malabsorption/Dietary Intolerance/Defects  1)Low FODMAP diet, specifically the [facility] low FODMAP jules recommended which is one of the most comprehensive lists of foods that may cause gas, bloating, and diarrhea. A strict low FODMAP diet should not be followed for more then a 1-2 months as it can lead to some vitamin deficiencies so once your are better, you should start adding foods back one at a time. Aside from a low FODMAP diet, there are 3 specific disaccharidase deficiencies that people may have including lactose, fructose, or sucrose. One can try avoidance of these for brief periods. 2)Food allergies or gluten intolerance separate from celiac disease. A diet log is probably the best way to determine this as there is not easy good testing aside from celiac disease. Up to 35% may be gluten intolerant. Antidiarrheals.   Many of these help with bowel consistency but not many of the other symptoms of IBS. 1)OTC - imodium or kaopectate  2)RX - Lomotil  3)bile acid binders (also used to treat cholesterol) - Questran, Welchol, or cholestid. These are good for microscopic colitis, in diabetics with diarrhea, diarrhea after gallbladder resection, and in elderly. They can interfere with other medications. 4)Narcotic mu receptor analog - Daksha Righter  5)Lotronex is approved for women. Anti-spasmotics/anti-cholinergics  1)Bentyl (dicyclomine) or Levsin (hyoscyamine) - great for symptoms related to eating (also referred to as the gastrocolonic reflex). Should be avoided in those over 65. 2)Donnatal  3)Librax    Neuromodulators referred to as Tricyclic antidepressants in low doses 12.5-50mg. This actually has the largest amount of data available compared to all other agents above. 1)Pamelor (nortriptyline), Tofranil (imiprimine) are preferred over Elavil (amytriptyline) with less side effects. Additional workup is indicated for refractory or warning symptoms and may include testing for SUNDEEP (pancreatic exocrine insufficiency)/chronic pancreatitis or secretory diarrhea.

## 2019-05-17 NOTE — LETTER
[address] ,  [address] [facility]  Phone: [phone] [facility],  [address] [facility], [address] [place] [address]  Phone: [phone]   ZFR:[phone]    05/17/19    Patient:Leila Valdovinos  MR [id number]  YOB: 1975  Date of Visit:5/17/19    Dear Dr. Alexandra Oviedo MD    Thank you for the request for consultation for Ashanti Fishman to me for the evaluation of   Chief Complaint   Patient presents with    New Patient     GERD r/by Dr. Chele Mejia   . Below are the relevant portions of my assessment and plan of care. FINAL DIAGNOSIS/Assessment   Diagnosis Orders   1. Heartburn  omeprazole (PRILOSEC) 40 MG delayed release capsule   2. Diarrhea, unspecified type  Calprotectin, Fecal    H. PYLORI ANTIGEN, STOOL       VISIT ORDERS/Plan  Orders Placed This Encounter   Procedures    H. PYLORI ANTIGEN, STOOL     Standing Status:   Future     Standing Expiration Date:   5/17/2020    Calprotectin, Fecal     Standing Status:   Future     Standing Expiration Date:   5/17/2020       If you have questions, please do not hesitate to call me. I look forward to following Ashanti Fishman along with you.     Sincerely,        YUKO CASTELAN 5/17/19 1:12 PM

## 2019-05-17 NOTE — PROGRESS NOTES
[facility],[address],  [address] [address], [address] [place] [address]  Phone: [phone]   Fax:[phone]    CHIEF COMPLAINT     Chief Complaint   Patient presents with    New Patient     GERD r/by Dr. Cooper Taveras       HPI     Thank you SHAUNNA APARICIO MD for asking me to see Denia Paul in consultation. She is a Single [1] White [1] 38 y.o. Malia Winslow female seen independently who presents with the following GI complaints: Malia Winslow [place]  Has refractory heartburn on prilosec for year and recently changed to protonix. Had not tried prilosec bid. Also was drinking carbonated beverages on previous prilosec. Ulcers run in the family  Denies asa use but takes mobic daily. Has frequent diarrhea worse postprandially without bleeding. Bentyl and imodium help only a little. Has had bidirectional endoscopy in the past a long time ago. HPI elements: location, severity, timing, modifying factors, quality, duration, context and associated signs/symptoms. Last Encounter Reviewed:   Pertinent PMH, FH, SH is reviewed below. Last EGD: years ago  Last Colonoscopy: years ago    Review of available records reveals:   Wt Readings from Last 50 Encounters:   05/17/19 (!) 326 lb (147.9 kg)   05/08/19 (!) 335 lb (152 kg)   05/07/19 (!) 336 lb (152.4 kg)   05/06/19 (!) 340 lb (154.2 kg)       No components found for: HGBA1C  BP Readings from Last 3 Encounters:   05/17/19 130/78   05/08/19 132/78   05/07/19 (!) 140/84     Health Maintenance   Topic Date Due    HIV screen  04/29/1989    Cervical cancer screen  04/29/1995    Flu vaccine (Season Ended) 09/01/2019    DTaP/Tdap/Td vaccine (2 - Td) 01/01/2022    Diabetes screen  05/17/2022    Lipid screen  05/17/2024    Pneumococcal 0-64 years Vaccine  Completed       No components found for: [facility]     PAST MEDICAL HISTORY     Past Medical History:   Diagnosis Date    Asthma     COPD with acute exacerbation ([facility] [address] [facility] 6/30/2015    Endometriosis     Fibromyalgia     Gastroparesis     GERD (gastroesophageal reflux disease)     Irritable bowel syndrome     Migraines     Osteoarthritis     Psychiatric problem      FAMILY HISTORY     Family History   Problem Relation Age of Onset    Depression Mother     Mental Illness Mother     Cancer Mother         skin    Rheum Arthritis Mother     High Blood Pressure Mother     Arthritis Father     Other Father         Bleeding ulcer    Blindness Father     High Blood Pressure Maternal Grandmother     Stroke Maternal Grandmother     Cancer Maternal Grandfather         Leukemia    Dementia Paternal Grandmother     Stroke Paternal Grandfather     High Blood Pressure Paternal Grandfather     Breast Cancer Other      SOCIAL HISTORY     Social History     Socioeconomic History    Marital status: Single     Spouse name: Not on file    Number of children: Not on file    Years of education: Not on file    Highest education level: Not on file   Occupational History    Occupation: unemployed   Social Needs    Financial resource strain: Not on file    Food insecurity:     Worry: Not on file     Inability: Not on file    Transportation needs:     Medical: Not on file     Non-medical: Not on file   Tobacco Use    Smoking status: Heavy Tobacco Smoker     Packs/day: 1.00     Years: 20.00     Pack years: 20.00     Start date: 6/29/1995    Smokeless tobacco: Never Used   Substance and Sexual Activity    Alcohol use: Yes     Comment: monthly    Drug use: No    Sexual activity: Not Currently     Partners: Male   Lifestyle    Physical activity:     Days per week: Not on file     Minutes per session: Not on file    Stress: Not on file   Relationships    Social connections:     Talks on phone: Not on file     Gets together: Not on file     Attends Hindu service: Not on file     Active member of club or organization: Not on file     Attends meetings of clubs or organizations: Not on file     Relationship status: Not on file  Intimate partner violence:     Fear of current or ex partner: Not on file     Emotionally abused: Not on file     Physically abused: Not on file     Forced sexual activity: Not on file   Other Topics Concern    Not on file   Social History Narrative    Not on file     SURGICAL HISTORY     Past Surgical History:   Procedure Laterality Date    COLONOSCOPY      DILATATION, ESOPHAGUS      ENDOMETRIAL ABLATION      ENDOSCOPY, COLON, DIAGNOSTIC      SALPINGO-OOPHORECTOMY Left 2009     CURRENT MEDICATIONS   (This list may include medications prescribed during this encounter as epic can not insert only the list prior to this encounter.)  Current Outpatient Rx   Medication Sig Dispense Refill    omeprazole (PRILOSEC) 40 MG delayed release capsule Take 1 capsule by mouth 2 times daily 60 capsule 1    citalopram (CELEXA) 40 MG tablet Take 1 tablet by mouth daily 30 tablet 0    mometasone-formoterol (DULERA) 200-5 MCG/ACT inhaler Inhale 2 puffs into the lungs 2 times daily 1 Inhaler 11    levocetirizine (XYZAL) 5 MG tablet Take 5 mg by mouth nightly      pantoprazole (PROTONIX) 40 MG tablet Take 1 tablet by mouth every morning (before breakfast) 30 tablet 0    fluticasone (FLONASE) 50 MCG/ACT nasal spray 2 sprays by Each Nare route daily 2 Sprays in each nostril 1 Bottle 0    montelukast (SINGULAIR) 10 MG tablet Take 1 tablet by mouth daily 30 tablet 3    topiramate (TOPAMAX) 50 MG tablet Take 1 tablet by mouth daily 30 tablet 0    dicyclomine (BENTYL) 10 MG capsule 1-2 po qac prn to prevent diarrhea 120 capsule 0    meloxicam (MOBIC) 15 MG tablet Take 1 tablet by mouth daily 30 tablet 0    buPROPion (WELLBUTRIN XL) 150 MG extended release tablet Take 1 tablet by mouth every morning 30 tablet 0    budesonide-formoterol (SYMBICORT) 160-4.5 MCG/ACT AERO Inhale 2 puffs into the lungs 2 times daily 1 Inhaler 11    albuterol sulfate HFA (PROAIR HFA) 108 (90 Base) MCG/ACT inhaler Use 1-2 puffs every 4 hours discharge. Neck: Normal range of motion, No tenderness, Supple, No stridor. Lymphatic: No cervical, subclavian, or axillary lymphadenopathy. Cardiovascular: Normal heart rate, Normal rhythm, No murmurs, No rubs, No gallops. Thorax & Lungs: Normal breath sounds, No respiratory distress, No wheezing, No chest tenderness. No gynecomastia. Abdomen: scars consistent with stated surgeries, no hernias, no HSM, soft NTND   Rectal:  Deferred. Skin: Warm, Dry, No erythema, No rash. No bruising. No spider hemangiomas. Back: No tenderness, No CVA tenderness. Lower Extremities: Intact distal pulses, No edema, No tenderness, No cyanosis, No clubbing. Neurologic: Alert & oriented x 3, Normal motor function, Normal sensory function, No focal deficits noted. No asterixis. RADIOLOGY/PROCEDURES         FINAL IMPRESSION     Orders Placed This Encounter   Procedures    H. PYLORI ANTIGEN, STOOL     Standing Status:   Future     Standing Expiration Date:   5/17/2020    Calprotectin, Fecal     Standing Status:   Future     Standing Expiration Date:   5/17/2020     Emilia Sellers was seen today for new patient. Diagnoses and all orders for this visit:    Heartburn  -     omeprazole (PRILOSEC) 40 MG delayed release capsule; Take 1 capsule by mouth 2 times daily    Diarrhea, unspecified type  -     Calprotectin, Fecal; Future  -     H. PYLORI ANTIGEN, STOOL; Future      ORDERED FUTURE/PENDING TESTS     Lab Frequency Next Occurrence   TSH without Reflex Once 03/16/2020   T4, Free Once 03/16/2020   Lipid Panel Once 03/16/2020   Hemoglobin A1C Once 03/16/2020   T3, FREE Once 03/16/2020       FOLLOWUP   Return in about 1 month (around 6/14/2019).           Te [address] 5/17/19 12:48 PM    CC:  Kely Mortensen MD

## 2019-05-18 LAB
ESTIMATED AVERAGE GLUCOSE: 99.7 MG/DL
HBA1C MFR BLD: 5.1 %

## 2019-05-20 ENCOUNTER — OFFICE VISIT (OUTPATIENT)
Dept: ORTHOPEDIC SURGERY | Age: 44
End: 2019-05-20
Payer: COMMERCIAL

## 2019-05-20 VITALS — BODY MASS INDEX: 39.68 KG/M2 | HEIGHT: 72 IN | WEIGHT: 293 LBS

## 2019-05-20 DIAGNOSIS — M25.561 CHRONIC PAIN OF BOTH KNEES: ICD-10-CM

## 2019-05-20 DIAGNOSIS — M25.512 LEFT SHOULDER PAIN, UNSPECIFIED CHRONICITY: Primary | ICD-10-CM

## 2019-05-20 DIAGNOSIS — M25.562 CHRONIC PAIN OF BOTH KNEES: ICD-10-CM

## 2019-05-20 DIAGNOSIS — G89.29 CHRONIC PAIN OF BOTH KNEES: ICD-10-CM

## 2019-05-20 PROBLEM — M75.82 ROTATOR CUFF TENDINITIS, LEFT: Status: ACTIVE | Noted: 2019-05-20

## 2019-05-20 PROCEDURE — G8417 CALC BMI ABV UP PARAM F/U: HCPCS | Performed by: ORTHOPAEDIC SURGERY

## 2019-05-20 PROCEDURE — 99203 OFFICE O/P NEW LOW 30 MIN: CPT | Performed by: ORTHOPAEDIC SURGERY

## 2019-05-20 PROCEDURE — 4004F PT TOBACCO SCREEN RCVD TLK: CPT | Performed by: ORTHOPAEDIC SURGERY

## 2019-05-20 PROCEDURE — G8427 DOCREV CUR MEDS BY ELIG CLIN: HCPCS | Performed by: ORTHOPAEDIC SURGERY

## 2019-05-20 NOTE — RESULT ENCOUNTER NOTE
Inform patient:  Your triglyceride is high which means you're prone to developing diabetes in the future due to your body's difficulty in digesting carbohydrates. Exercising more 30 minutes 5 days a week and cutting down on portion size along with low carbohydrates (avoiding sweets/alcohol) will help reduce triglyceride.   Your thyroid and diabetic lab are normal.

## 2019-05-20 NOTE — PROGRESS NOTES
Chief Complaint  New Patient (left Arm: unsure if there was and injury, but slipped on ice and fell on left side in Dec 2018, most of the pain in biceps,she had no pain after fall, but noticed that arm was bothering her more around March, increase pain with reaching above shoulde and reaching behind back,  burning sensation, and throbbning in muscle )      History of Present Illness:  Jethro Meigs is a 38 y.o. female. She presents today for a new hand surgery and upper extremity specialty evaluation regarding her left arm and also reports ongoing chronic pain in both knees. She remembers falling back on the ice in December 2018 on her left side. Around March of this year she started to have increasing pain over the anterolateral left shoulder and with occasional radiating pain down the anterior arm. Simple things such as fastening a bra has been uncomfortable but she denies any instability episodes. The patient also relates having a prior tibial plateau fracture and chronic bilateral knee pain. In the past she had been recommended for physical therapy but never had the chance to finish the therapy as other medical issues have intervened.     Medical History  Past Medical History:   Diagnosis Date    Asthma     COPD with acute exacerbation ([facility] [address] 6/30/2015    Endometriosis     Fibromyalgia     Gastroparesis     GERD (gastroesophageal reflux disease)     Irritable bowel syndrome     Migraines     Osteoarthritis     Psychiatric problem      Past Surgical History:   Procedure Laterality Date    COLONOSCOPY      DILATATION, ESOPHAGUS      ENDOMETRIAL ABLATION      ENDOSCOPY, COLON, DIAGNOSTIC      SALPINGO-OOPHORECTOMY Left 2009     Social History     Socioeconomic History    Marital status: Single     Spouse name: None    Number of children: None    Years of education: None    Highest education level: None   Occupational History    Occupation: unemployed   Social Needs    Financial resource strain: None    Food insecurity:     Worry: None     Inability: None    Transportation needs:     Medical: None     Non-medical: None   Tobacco Use    Smoking status: Heavy Tobacco Smoker     Packs/day: 1.00     Years: 20.00     Pack years: 20.00     Start date: 6/29/1995    Smokeless tobacco: Never Used   Substance and Sexual Activity    Alcohol use: Yes     Comment: monthly    Drug use: No    Sexual activity: Not Currently     Partners: Male   Lifestyle    Physical activity:     Days per week: None     Minutes per session: None    Stress: None   Relationships    Social connections:     Talks on phone: None     Gets together: None     Attends Adventist service: None     Active member of club or organization: None     Attends meetings of clubs or organizations: None     Relationship status: None    Intimate partner violence:     Fear of current or ex partner: None     Emotionally abused: None     Physically abused: None     Forced sexual activity: None   Other Topics Concern    None   Social History Narrative    None     Current Outpatient Medications   Medication Sig Dispense Refill    omeprazole (PRILOSEC) 40 MG delayed release capsule Take 1 capsule by mouth 2 times daily 60 capsule 1    citalopram (CELEXA) 40 MG tablet Take 1 tablet by mouth daily 30 tablet 0    mometasone-formoterol (DULERA) 200-5 MCG/ACT inhaler Inhale 2 puffs into the lungs 2 times daily 1 Inhaler 11    levocetirizine (XYZAL) 5 MG tablet Take 5 mg by mouth nightly      fluticasone (FLONASE) 50 MCG/ACT nasal spray 2 sprays by Each Nare route daily 2 Sprays in each nostril 1 Bottle 0    montelukast (SINGULAIR) 10 MG tablet Take 1 tablet by mouth daily 30 tablet 3    topiramate (TOPAMAX) 50 MG tablet Take 1 tablet by mouth daily 30 tablet 0    dicyclomine (BENTYL) 10 MG capsule 1-2 po qac prn to prevent diarrhea 120 capsule 0    meloxicam (MOBIC) 15 MG tablet Take 1 tablet by mouth daily 30 tablet 0    buPROPion palpate over the biceps. Skin: There are no rashes, ulcerations or lesions. [place]: Normal but with tenderness subjectively in both knees    Additional Comments:     Additional Examinations:  Right Upper Extremity:  Examination of the right upper extremity does not show any tenderness, deformity or injury. Range of motion is unremarkable. There is no gross instability. There are no rashes, ulcerations or lesions. Strength and tone are normal.    Radiology:     X-rays obtained and reviewed in office:  Views 3 views  Location left shoulder  Impression x-rays demonstrate satisfactory glenohumeral joint space, no sign of any subluxation, and no sign of any concerning lesion    Diagnostic Test Findings:      Assessment and Plan:  Left shoulder pain consistent with rotator cuff and biceps tendinitis, and chronic bilateral knee pain    Impression:  Encounter Diagnoses   Name Primary?  Left shoulder pain, unspecified chronicity Yes    Chronic pain of both knees        Office Procedures:  Orders Placed This Encounter   Procedures    XR SHOULDER LEFT (MIN 2 VIEWS)     Standing Status:   Future     Number of Occurrences:   1     Standing Expiration Date:   5/20/2020   Colleen Erika PT [address] [facility]     Referral Priority:   Routine     Referral Type:   Eval and Treat     Referral Reason:   Specialty Services Required     Requested Specialty:   Physical Therapy     Number of Visits Requested:   1       Treatment Plan:  I've recommended physical therapy for her left shoulder but we will also have them include some generalized strengthening and exercises for her knees. I do not sense of functional instability or a likely high-grade tear, and we will have her come back and see one of my shoulder and knee partners in approximately 6 weeks to see a she's coming along.   If she is not making progress and additional workup and additional interventions could be discussed from a more sports medicine and

## 2019-05-21 ENCOUNTER — OFFICE VISIT (OUTPATIENT)
Dept: INTERNAL MEDICINE CLINIC | Age: 44
End: 2019-05-21
Payer: COMMERCIAL

## 2019-05-21 ENCOUNTER — OFFICE VISIT (OUTPATIENT)
Dept: PSYCHOLOGY | Age: 44
End: 2019-05-21
Payer: COMMERCIAL

## 2019-05-21 VITALS
OXYGEN SATURATION: 98 % | HEART RATE: 77 BPM | HEIGHT: 72 IN | SYSTOLIC BLOOD PRESSURE: 102 MMHG | WEIGHT: 293 LBS | DIASTOLIC BLOOD PRESSURE: 70 MMHG | BODY MASS INDEX: 39.68 KG/M2

## 2019-05-21 DIAGNOSIS — F33.1 MDD (MAJOR DEPRESSIVE DISORDER), RECURRENT EPISODE, MODERATE (HCC): ICD-10-CM

## 2019-05-21 DIAGNOSIS — M15.9 PRIMARY OSTEOARTHRITIS INVOLVING MULTIPLE JOINTS: ICD-10-CM

## 2019-05-21 DIAGNOSIS — F41.1 GAD (GENERALIZED ANXIETY DISORDER): Primary | ICD-10-CM

## 2019-05-21 DIAGNOSIS — F32.A ANXIETY AND DEPRESSION: ICD-10-CM

## 2019-05-21 DIAGNOSIS — J30.89 ENVIRONMENTAL AND SEASONAL ALLERGIES: ICD-10-CM

## 2019-05-21 DIAGNOSIS — F41.9 ANXIETY AND DEPRESSION: ICD-10-CM

## 2019-05-21 DIAGNOSIS — M79.602 LEFT ARM PAIN: ICD-10-CM

## 2019-05-21 DIAGNOSIS — R51.9 HEADACHE DISORDER: ICD-10-CM

## 2019-05-21 PROCEDURE — 90791 PSYCH DIAGNOSTIC EVALUATION: CPT | Performed by: SOCIAL WORKER

## 2019-05-21 PROCEDURE — G8427 DOCREV CUR MEDS BY ELIG CLIN: HCPCS | Performed by: INTERNAL MEDICINE

## 2019-05-21 PROCEDURE — 99214 OFFICE O/P EST MOD 30 MIN: CPT | Performed by: INTERNAL MEDICINE

## 2019-05-21 PROCEDURE — 4004F PT TOBACCO SCREEN RCVD TLK: CPT | Performed by: INTERNAL MEDICINE

## 2019-05-21 PROCEDURE — G8417 CALC BMI ABV UP PARAM F/U: HCPCS | Performed by: INTERNAL MEDICINE

## 2019-05-21 PROCEDURE — 4004F PT TOBACCO SCREEN RCVD TLK: CPT | Performed by: SOCIAL WORKER

## 2019-05-21 RX ORDER — MELOXICAM 15 MG/1
15 TABLET ORAL DAILY
Qty: 30 TABLET | Refills: 10 | Status: SHIPPED | OUTPATIENT
Start: 2019-05-21 | End: 2020-07-09

## 2019-05-21 RX ORDER — LEVOCETIRIZINE DIHYDROCHLORIDE 5 MG/1
5 TABLET, FILM COATED ORAL NIGHTLY
Qty: 30 TABLET | Refills: 11 | Status: ON HOLD | OUTPATIENT
Start: 2019-05-21 | End: 2019-07-30

## 2019-05-21 RX ORDER — CITALOPRAM 40 MG/1
40 TABLET ORAL DAILY
Qty: 30 TABLET | Refills: 10 | Status: SHIPPED | OUTPATIENT
Start: 2019-05-21 | End: 2020-07-09 | Stop reason: SDUPTHER

## 2019-05-21 RX ORDER — FLUTICASONE PROPIONATE 50 MCG
2 SPRAY, SUSPENSION (ML) NASAL DAILY
Qty: 1 BOTTLE | Refills: 10 | Status: SHIPPED | OUTPATIENT
Start: 2019-05-21 | End: 2019-05-30 | Stop reason: SDUPTHER

## 2019-05-21 RX ORDER — BUPROPION HYDROCHLORIDE 150 MG/1
150 TABLET ORAL EVERY MORNING
Qty: 30 TABLET | Refills: 0 | Status: SHIPPED | OUTPATIENT
Start: 2019-05-21 | End: 2019-06-25

## 2019-05-21 RX ORDER — TOPIRAMATE 100 MG/1
100 TABLET, FILM COATED ORAL DAILY
Qty: 30 TABLET | Refills: 0 | Status: SHIPPED | OUTPATIENT
Start: 2019-05-21 | End: 2019-06-25 | Stop reason: SDUPTHER

## 2019-05-21 ASSESSMENT — PATIENT HEALTH QUESTIONNAIRE - PHQ9
SUM OF ALL RESPONSES TO PHQ QUESTIONS 1-9: 11
5. POOR APPETITE OR OVEREATING: 1
SUM OF ALL RESPONSES TO PHQ9 QUESTIONS 1 & 2: 3
3. TROUBLE FALLING OR STAYING ASLEEP: 3
2. FEELING DOWN, DEPRESSED OR HOPELESS: 1
7. TROUBLE CONCENTRATING ON THINGS, SUCH AS READING THE NEWSPAPER OR WATCHING TELEVISION: 0
9. THOUGHTS THAT YOU WOULD BE BETTER OFF DEAD, OR OF HURTING YOURSELF: 0
SUM OF ALL RESPONSES TO PHQ QUESTIONS 1-9: 11
6. FEELING BAD ABOUT YOURSELF - OR THAT YOU ARE A FAILURE OR HAVE LET YOURSELF OR YOUR FAMILY DOWN: 2
10. IF YOU CHECKED OFF ANY PROBLEMS, HOW DIFFICULT HAVE THESE PROBLEMS MADE IT FOR YOU TO DO YOUR WORK, TAKE CARE OF THINGS AT HOME, OR GET ALONG WITH OTHER PEOPLE: 2
8. MOVING OR SPEAKING SO SLOWLY THAT OTHER PEOPLE COULD HAVE NOTICED. OR THE OPPOSITE, BEING SO FIGETY OR RESTLESS THAT YOU HAVE BEEN MOVING AROUND A LOT MORE THAN USUAL: 0
4. FEELING TIRED OR HAVING LITTLE ENERGY: 2
1. LITTLE INTEREST OR PLEASURE IN DOING THINGS: 2

## 2019-05-21 NOTE — PROGRESS NOTES
Jethro Meigs  YOB: 1975    Date of Service:  5/21/2019    Chief Complaint:      Chief Complaint   Patient presents with    Headache    Anxiety    Depression       HPI:  Jethro Meigs is a 38 y.o. Anxiety/Depression:  Not sure if Wellbutrin  mg qd is helping, no side effects and Celexa 40 mg qd. She does feel a little better mostly because she's able to lose some weight recently. Depression due to downturn in her health and chronic arthritis pain. She's been on Celexa for a long time from previous PCP.     Allergies:  Still having allergy symptoms despite being on Flonase, Xyzal and Singulair. She's seeing Dr. Patsy Forman. She complains of severe heartburn with acid reflux and have failed Nexium (made it worse) and other PPI without relief.     Bilateral knee, ankles and feet for the past 10 years. Have improved on Mobic 15 mg qd. Had negative w/u for RA since FH RA.     Headache behind her eyes and forehead improved almost daily for the past 6-8 months to mild headache 3 days a week with starting topamax 50 mg qd. She gets nauseas and photophobia. S    Diarrhea improved since started Bentyl 10 mg bid with lunch and dinner.       Lab Results   Component Value Date    LABA1C 5.1 05/17/2019    LABMICR Yes 06/29/2015     Lab Results   Component Value Date     05/06/2019    K 3.5 05/06/2019     05/06/2019    CO2 24 05/06/2019    BUN 15 05/06/2019    CREATININE 1.0 05/06/2019    GLUCOSE 117 (H) 05/06/2019    CALCIUM 8.8 05/06/2019     Lab Results   Component Value Date    CHOL 169 05/17/2019    TRIG 254 05/17/2019    HDL 52 05/17/2019    LDLCALC 66 05/17/2019     Lab Results   Component Value Date    ALT 22 05/06/2019    AST 13 (L) 05/06/2019     Lab Results   Component Value Date    TSH 1.03 05/17/2019    T4FREE 1.1 05/17/2019     Lab Results   Component Value Date    WBC 9.7 05/06/2019    HGB 13.4 05/06/2019    HCT 38.2 05/06/2019    MCV 94.4 05/06/2019     05/06/2019 Lab Results   Component Value Date    INR 0.88 01/31/2014      No results found for: PSA   No results found for: [facility]     Patient Active Problem List   Diagnosis    COPD with acute exacerbation ([facility]    Class 3 severe obesity due to excess calories without serious comorbidity with body mass index (BMI) of 45.0 to 49.9 in adult ([facility]    Smoker    Asthma    Primary osteoarthritis involving multiple joints    Environmental and seasonal allergies    Gastroesophageal reflux disease without esophagitis    Irritable bowel syndrome with diarrhea    Anxiety and depression    Rotator cuff tendinitis, left    Chronic pain of both knees       Allergies   Allergen Reactions    Compazine [Prochlorperazine] Anaphylaxis    Iv Dye [Iodides] Hives and Itching    Toradol [Ketorolac Tromethamine]      Outpatient Medications Marked as Taking for the 5/21/19 encounter (Office Visit) with Maris Medina MD   Medication Sig Dispense Refill    omeprazole (PRILOSEC) 40 MG delayed release capsule Take 1 capsule by mouth 2 times daily 60 capsule 1    citalopram (CELEXA) 40 MG tablet Take 1 tablet by mouth daily 30 tablet 0    mometasone-formoterol (DULERA) 200-5 MCG/ACT inhaler Inhale 2 puffs into the lungs 2 times daily 1 Inhaler 11    levocetirizine (XYZAL) 5 MG tablet Take 5 mg by mouth nightly      fluticasone (FLONASE) 50 MCG/ACT nasal spray 2 sprays by Each Nare route daily 2 Sprays in each nostril 1 Bottle 0    montelukast (SINGULAIR) 10 MG tablet Take 1 tablet by mouth daily 30 tablet 3    topiramate (TOPAMAX) 50 MG tablet Take 1 tablet by mouth daily 30 tablet 0    dicyclomine (BENTYL) 10 MG capsule 1-2 po qac prn to prevent diarrhea 120 capsule 0    meloxicam (MOBIC) 15 MG tablet Take 1 tablet by mouth daily 30 tablet 0    buPROPion (WELLBUTRIN XL) 150 MG extended release tablet Take 1 tablet by mouth every morning 30 tablet 0    budesonide-formoterol (SYMBICORT) 160-4.5 MCG/ACT AERO Inhale 2 puffs into tablet by mouth nightly  Dr. Corey Manrique doing w/u for allergies    Anxiety and depression  -     citalopram (CELEXA) 40 MG tablet; Take 1 tablet by mouth daily  -     buPROPion (WELLBUTRIN XL) 150 MG extended release tablet; Take 1 tablet by mouth every morning    Primary osteoarthritis involving multiple joints  -     meloxicam (MOBIC) 15 MG tablet; Take 1 tablet by mouth daily    Left arm pain  -     meloxicam (MOBIC) 15 MG tablet; Take 1 tablet by mouth daily        Return 1 month on HA, depression, allergies.

## 2019-05-21 NOTE — PROGRESS NOTES
Behavioral Health Consultation  Thony Christian. [address]  5/21/2019  1:37 PM      Time spent with Patient: 30 minutes  [facility]. Reason for Consult:  depression and anxiety  Referring Provider: Monique Medina MD  [address] [place] [address] [employer] [facility], [address] [place] [address]    Pt provided informed consent for the behavioral health program. Discussed with patient model of service to include the limits of confidentiality (i.e. abuse reporting, suicide intervention, etc.) and short-term intervention focused approach. Pt indicated understanding. Feedback given to PCP. S:  Pt endorses that what she really needs in therapy, she needs someone to work things out with. She has people in her life to talk to but needs more direction. She feels over the last year and half health has really declined. Pt got pregnant, she didn't think she could get pregnant due to endometrosis but did. She then lost the baby when she was about 3 months along. Pt struggling with many medical issues this past year. Gained about 60lbs, arthritis flared up. Pt feels terrible, used to love to hike, trained horses, went out and socialized. She is not doing anything like this anymore. She has no income, boyfriend has been footing the bills lately. Pt is feeling really stuck. She has lost 15lbs over the past 2.5 weeks. She is looking to get active working again with [employer]. Pt's sleep is not very good. Sleeps the best on the couch. She and BF will be moving to [facility] in a few months, so will likely want a referral for therapy closer to home. No SI/HI.      O:    MSE:    Appearance    alert, cooperative  Appetite normal  Sleep disturbance Yes  Fatigue Yes  Loss of pleasure Yes  Impulsive behavior No  Speech    spontaneous, normal rate and normal volume  Mood    Anxious  Depressed  Affect    depressed affect  Thought Content    cognitive distortions  Thought Process    linear, goal directed and coherent  Associations    logical connections  Insight    Good  Judgment    Intact  Orientation    oriented to person, place, time, and general circumstances  Memory    recent and remote memory intact  Attention/Concentration    intact  Morbid ideation No  Suicide Assessment    no suicidal ideation      History:    Medications:   Current Outpatient Medications   Medication Sig Dispense Refill    citalopram (CELEXA) 40 MG tablet Take 1 tablet by mouth daily 30 tablet 10    fluticasone (FLONASE) 50 MCG/ACT nasal spray 2 sprays by Each Nare route daily 2 Sprays in each nostril 1 Bottle 10    levocetirizine (XYZAL) 5 MG tablet Take 1 tablet by mouth nightly 30 tablet 11    topiramate (TOPAMAX) 100 MG tablet Take 1 tablet by mouth daily 30 tablet 0    buPROPion (WELLBUTRIN XL) 150 MG extended release tablet Take 1 tablet by mouth every morning 30 tablet 0    meloxicam (MOBIC) 15 MG tablet Take 1 tablet by mouth daily 30 tablet 10    omeprazole (PRILOSEC) 40 MG delayed release capsule Take 1 capsule by mouth 2 times daily 60 capsule 1    mometasone-formoterol (DULERA) 200-5 MCG/ACT inhaler Inhale 2 puffs into the lungs 2 times daily 1 Inhaler 11    montelukast (SINGULAIR) 10 MG tablet Take 1 tablet by mouth daily 30 tablet 3    SUMAtriptan (IMITREX) 100 MG tablet Take 1 tablet by mouth once as needed for Migraine Repeat in 2 hrs, maximum 2 pills within 24hr 9 tablet 0    dicyclomine (BENTYL) 10 MG capsule 1-2 po qac prn to prevent diarrhea 120 capsule 0    budesonide-formoterol (SYMBICORT) 160-4.5 MCG/ACT AERO Inhale 2 puffs into the lungs 2 times daily 1 Inhaler 11    albuterol sulfate HFA (PROAIR HFA) 108 (90 Base) MCG/ACT inhaler Use 1-2 puffs every 4 hours while awake for 3 days then PRN wheezing/cough. Dispense with SPACER and Instruct on use. May sub Ventolin or Proventil as needed per [employer]. 1 Inhaler 1    acyclovir (ZOVIRAX) 400 MG tablet Take 400 mg by mouth daily.        No current facility-administered medications for this visit. Social History:   Social History     Socioeconomic History    Marital status: Single     Spouse name: Not on file    Number of children: Not on file    Years of education: Not on file    Highest education level: Not on file   Occupational History    Occupation: unemployed   Social Needs    Financial resource strain: Not on file    Food insecurity:     Worry: Not on file     Inability: Not on file   [employer] needs:     Medical: Not on file     Non-medical: Not on file   Tobacco Use    Smoking status: Heavy Tobacco Smoker     Packs/day: 1.00     Years: 20.00     Pack years: 20.00     Start date: 6/29/1995    Smokeless tobacco: Never Used   Substance and Sexual Activity    Alcohol use: Yes     Comment: monthly    Drug use: No    Sexual activity: Not Currently     Partners: Male   Lifestyle    Physical activity:     Days per week: Not on file     Minutes per session: Not on file    Stress: Not on file   Relationships    Social connections:     Talks on phone: Not on file     Gets together: Not on file     Attends Yazidi service: Not on file     Active member of club or organization: Not on file     Attends meetings of clubs or organizations: Not on file     Relationship status: Not on file    Intimate partner violence:     Fear of current or ex partner: Not on file     Emotionally abused: Not on file     Physically abused: Not on file     Forced sexual activity: Not on file   Other Topics Concern    Not on file   Social History Narrative    Not on file       TOBACCO:   reports that she has been smoking. She started smoking about 23 years ago. She has a 20.00 pack-year smoking history. She has never used smokeless tobacco.  ETOH:   reports that she drinks alcohol.     Family History:   Family History   Problem Relation Age of Onset    Depression Mother     Mental Illness Mother     Cancer Mother         skin    Rheum Arthritis Mother     High Blood Pressure Mother     Arthritis Father     Other Father         Bleeding ulcer    Blindness Father     High Blood Pressure Maternal Grandmother     Stroke Maternal Grandmother     Cancer Maternal Grandfather         Leukemia    Dementia Paternal Grandmother     Stroke Paternal Grandfather     High Blood Pressure Paternal Grandfather     Breast Cancer Other      PHQ Scores 5/21/2019 5/8/2019   PHQ2 Score 3 6   PHQ9 Score 11 19     Interpretation of Total Score Depression Severity: 1-4 = Minimal depression, 5-9 = Mild depression, 10-14 = Moderate depression, 15-19 = Moderately severe depression, 20-27 = Severe depression      A:  Pt endorses long struggles with depression and anxiety. Poor relationship with mother growing up. [facility] since closer to where she will be moving and they accept care source. No SI/HI. Insight and motivation good. Diagnosis:  Major depressive disorder; recurrent and moderate  Generalized anxiety disorder      Past Diagnosis:      Diagnosis Date    Asthma     COPD with acute exacerbation ([facility] [address] 6/30/2015    Endometriosis     Fibromyalgia     Gastroparesis     GERD (gastroesophageal reflux disease)     Irritable bowel syndrome     Migraines     Osteoarthritis     Psychiatric problem        No diagnosis found.       Plan:  Pt interventions:  Provided handout on  anxiety, Trained in strategies for increasing balanced thinking, Discussed and set plan for behavioral activation, Trained in relaxation strategies, Discussed self-care (sleep, nutrition, rewarding activities, social support, exercise), Motivational Interviewing to increase patient confidence and compliance with adhering to behavioral change plan, Motivational Interviewing to determine importance and readiness for change, Established rapport and Supportive techniques      Pt Behavioral Change Plan:  See Pt Instructions

## 2019-05-21 NOTE — PATIENT INSTRUCTIONS
1.  http://[email or web address].info/. com/  2. Let's work to get to bed no later than 12:30am, wake at the same time  3. Try the handout below on PMR  4. Talk with Amando Tabares about getting out for walks  5. Return to see Brian Sotelosaurabh in 2 weeks. Progressive muscle relaxation (PMR) is an exercise that anyone can use to alleviate disturbing and disruptive emotional symptoms such as anxiety or insomnia. Like breathing exercises, visualization, and yoga, PMR is considered a relaxation technique. It's especially helpful in moments of high stress or nervousness, and even can help someone get through a panic attack. History of PMR  PMR was developed by an [address] [place] [address] physician, Steven Marcos, in the 1920s. Beba Dupont noted that regardless of their illness, the majority of his patients suffered from muscle pain and tension. When he suggested that they relax, he noticed that most people didn't seem connected enough to their physical tension to release it. This inspired Bbea Dupont to develop a sequence of steps for tightening and then relaxing groups of muscles. He found this allowed his patients to become more aware of their tension, to learn how to let go of it, and to recognize what it feels like to be in a relaxed state. Since then the technique has been modified many times but all modern variations of PMR are based on Parsonss original idea of systematically squeezing and then releasing isolated muscle groups. Does It Work--and How? PMR works in part by helping to overcome a normal reaction to stress known as the flight-or-fight response. In evolutionary terms, this reaction developed as a way to help animals survive a threat--either by running away or by meeting the opposition head-on. Over time the flight-or-fight response has become a common reaction to feelings of fear that often are out of proportion with reality.       Unfortunately, when it's not needed for actual survival, the flight-or-fight reaction

## 2019-05-24 ENCOUNTER — HOSPITAL ENCOUNTER (OUTPATIENT)
Dept: PULMONOLOGY | Age: 44
Discharge: HOME | End: 2019-05-24
Payer: COMMERCIAL

## 2019-05-24 VITALS — OXYGEN SATURATION: 96 %

## 2019-05-24 LAB
DLCO %PRED: 80 %
DLCO PRED: NORMAL ML/MIN/MMHG
DLCO/VA %PRED: NORMAL %
DLCO/VA PRED: NORMAL ML/MIN/MMHG
DLCO/VA: NORMAL ML/MIN/MMHG
DLCO: NORMAL ML/MIN/MMHG
EXPIRATORY TIME: NORMAL SEC
FEF 25-75% %PRED-PRE: NORMAL L/SEC
FEF 25-75% PRED: NORMAL L/SEC
FEF 25-75%-PRE: NORMAL L/SEC
FEV1 %PRED-PRE: 94 %
FEV1 PRED: NORMAL L
FEV1/FVC %PRED-PRE: NORMAL %
FEV1/FVC PRED: NORMAL %
FEV1/FVC: 2.58 %
FEV1: NORMAL L
FVC %PRED-PRE: 75 %
FVC PRED: NORMAL L
FVC: NORMAL L
GAW %PRED: NORMAL %
GAW PRED: NORMAL L/S/CMH2O
GAW: NORMAL L/S/CMH2O
IC %PRED: NORMAL %
IC PRED: NORMAL L
IC: NORMAL L
MVV %PRED-PRE: NORMAL %
MVV PRED: NORMAL L/MIN
MVV-PRE: NORMAL L/MIN
PEF %PRED-PRE: NORMAL L/SEC
PEF PRED: NORMAL L/SEC
PEF-PRE: NORMAL L/SEC
RAW %PRED: NORMAL %
RAW PRED: NORMAL CMH2O/L/S
RAW: NORMAL CMH2O/L/S
RV %PRED: NORMAL %
RV PRED: NORMAL L
RV: NORMAL L
SVC %PRED: NORMAL %
SVC PRED: NORMAL L
SVC: NORMAL L
TLC %PRED: 37 %
TLC PRED: NORMAL L
TLC: NORMAL L
VA %PRED: NORMAL %
VA PRED: NORMAL L
VA: NORMAL L
VTG %PRED: NORMAL %
VTG PRED: NORMAL L
VTG: NORMAL L

## 2019-05-24 PROCEDURE — 94060 EVALUATION OF WHEEZING: CPT

## 2019-05-24 PROCEDURE — 94729 DIFFUSING CAPACITY: CPT

## 2019-05-24 PROCEDURE — 94760 N-INVAS EAR/PLS OXIMETRY 1: CPT

## 2019-05-24 PROCEDURE — 6370000000 HC RX 637 (ALT 250 FOR IP): Performed by: INTERNAL MEDICINE

## 2019-05-24 PROCEDURE — 94726 PLETHYSMOGRAPHY LUNG VOLUMES: CPT

## 2019-05-24 RX ORDER — ALBUTEROL SULFATE 90 UG/1
4 AEROSOL, METERED RESPIRATORY (INHALATION) ONCE
Status: COMPLETED | OUTPATIENT
Start: 2019-05-24 | End: 2019-05-24

## 2019-05-24 RX ADMIN — Medication 4 PUFF: at 09:10

## 2019-05-24 ASSESSMENT — PULMONARY FUNCTION TESTS
FVC_PERCENT_PREDICTED_PRE: 75
FEV1_PERCENT_PREDICTED_PRE: 94
FEV1/FVC: 2.58

## 2019-05-28 NOTE — PROCEDURES
[facility] [address]                               PULMONARY FUNCTION    PATIENT NAME: Kelly Bradley                      :        1975  MED REC NO:   [id number]                          ROOM:  ACCOUNT NO:   [de-identified]                           ADMIT DATE: 2019  PROVIDER:     Dasha Catherine MD    DATE OF PROCEDURE:  2019    PFT INTERPRETATION    The patient is a 41-year-old female who underwent a PFT. Spirometry  shows FVC to be 78%, FEV1 to be 75%, FEV1 to FVC ratio was 94%,  RLC11-30% was 75%. The patient had no significant postbronchodilator  improvement. The lung volume shows the total lung capacity to be  normal.  The patient does not have any air trapping or hyperinflation. The patient has decrease in ERV, which may be because of body habitus. The patient's diffusion capacity when adjusted for volume was normal.   The patient's flow volume loop was normal.  On the basis of this PFT,  the patient does not have any obstructive airway disease or any  restrictive lung disease, but the patient has changes in the PFT because  of body habitus. Please correlate clinically.         Rossy Rush MD    D: 2019 13:00:11       T: 2019 14:50:08     [email or web address]  Job#: [id number]     Doc#: [id number]    CC:

## 2019-05-30 ENCOUNTER — OFFICE VISIT (OUTPATIENT)
Dept: PULMONOLOGY | Age: 44
End: 2019-05-30
Payer: COMMERCIAL

## 2019-05-30 VITALS
HEIGHT: 72 IN | RESPIRATION RATE: 16 BRPM | BODY MASS INDEX: 39.68 KG/M2 | DIASTOLIC BLOOD PRESSURE: 72 MMHG | OXYGEN SATURATION: 97 % | HEART RATE: 78 BPM | SYSTOLIC BLOOD PRESSURE: 118 MMHG | WEIGHT: 293 LBS

## 2019-05-30 DIAGNOSIS — J30.89 ENVIRONMENTAL AND SEASONAL ALLERGIES: ICD-10-CM

## 2019-05-30 DIAGNOSIS — R06.02 SHORTNESS OF BREATH: ICD-10-CM

## 2019-05-30 DIAGNOSIS — R07.89 OTHER CHEST PAIN: Primary | ICD-10-CM

## 2019-05-30 DIAGNOSIS — J45.30 UNCONTROLLED MILD PERSISTENT ASTHMA: ICD-10-CM

## 2019-05-30 PROCEDURE — G8427 DOCREV CUR MEDS BY ELIG CLIN: HCPCS | Performed by: INTERNAL MEDICINE

## 2019-05-30 PROCEDURE — 99214 OFFICE O/P EST MOD 30 MIN: CPT | Performed by: INTERNAL MEDICINE

## 2019-05-30 PROCEDURE — 4004F PT TOBACCO SCREEN RCVD TLK: CPT | Performed by: INTERNAL MEDICINE

## 2019-05-30 PROCEDURE — G8417 CALC BMI ABV UP PARAM F/U: HCPCS | Performed by: INTERNAL MEDICINE

## 2019-05-30 RX ORDER — FLUTICASONE PROPIONATE 50 MCG
2 SPRAY, SUSPENSION (ML) NASAL DAILY
Qty: 1 BOTTLE | Refills: 11 | Status: SHIPPED
Start: 2019-05-30 | End: 2020-07-09

## 2019-05-30 NOTE — PROGRESS NOTES
MA Communication: The following orders are received by verbal communication from Dr. Miguelina Horne.     Orders include:  CT and Echo 6/11/19       FU scheduled 6/18/19

## 2019-05-30 NOTE — PATIENT INSTRUCTIONS
Remember to bring all pulmonary medications to your next appointment with the office. Please keep all of your future appointments scheduled by [address], [facility] Pulmonary office. Out of respect for other patients and providers, you may be asked to reschedule your appointment if you arrive later than your scheduled appointment time. Appointments cancelled less than 24hrs in advance will be considered a no show. Patients with three missed appointments within 1 year or four missed appointments within 2 years can be dismissed from the practice.

## 2019-06-03 ASSESSMENT — ENCOUNTER SYMPTOMS
STRIDOR: 0
EYE ITCHING: 0
CHEST TIGHTNESS: 0
SHORTNESS OF BREATH: 1
COUGH: 1
DIARRHEA: 0
CONSTIPATION: 0
SORE THROAT: 0
CHOKING: 0
WHEEZING: 0
EYE DISCHARGE: 0
ABDOMINAL PAIN: 0
EYE PAIN: 0
VOICE CHANGE: 0

## 2019-06-03 NOTE — PROGRESS NOTES
Pulmonary Outpatient Note   Meg Guerrero MD       5/30/2019    Chief Complaint:  Results (Labs & PFT )     HPI:   38y.o. year old female here for further evaluation of asthma/COPD. PFTs reviewed, no significant obstruction or post bronchodilator improvement per ATS criteria. Labs reviewed, normal IgE and allergen panel. Normal [id number] phenotype    Continues to endorse respiratory symptoms including cough and shortness of breath. Had relief with Symbicort but this was denied by insurance. She has not tried [facility] in the past.   She also has comorbid sinus allergies and associated post nasal drip that she feels is contributing to her symptoms. Smokes.      Past Medical History:   Diagnosis Date    Asthma     COPD with acute exacerbation ([facility] [address] 6/30/2015    Endometriosis     Fibromyalgia     Gastroparesis     GERD (gastroesophageal reflux disease)     Irritable bowel syndrome     Migraines     Osteoarthritis     Psychiatric problem        Past Surgical History:   Procedure Laterality Date    COLONOSCOPY      DILATATION, ESOPHAGUS      ENDOMETRIAL ABLATION      ENDOSCOPY, COLON, DIAGNOSTIC      SALPINGO-OOPHORECTOMY Left 2009       Social History     Tobacco Use    Smoking status: Heavy Tobacco Smoker     Packs/day: 1.00     Years: 20.00     Pack years: 20.00     Start date: 6/29/1995    Smokeless tobacco: Never Used   Substance Use Topics    Alcohol use: Yes     Comment: monthly          Family History   Problem Relation Age of Onset    Depression Mother     Mental Illness Mother     Cancer Mother         skin    Rheum Arthritis Mother     High Blood Pressure Mother     Arthritis Father     Other Father         Bleeding ulcer    Blindness Father     High Blood Pressure Maternal Grandmother     Stroke Maternal Grandmother     Cancer Maternal Grandfather         Leukemia    Dementia Paternal Grandmother     Stroke Paternal Grandfather     High Blood Pressure Paternal Grandfather     Breast Cancer Other          Current Outpatient Medications:     fluticasone (FLONASE) 50 MCG/ACT nasal spray, 2 sprays by Each Nostril route daily 2 Sprays in each nostril, Disp: 1 Bottle, Rfl: 11    citalopram (CELEXA) 40 MG tablet, Take 1 tablet by mouth daily, Disp: 30 tablet, Rfl: 10    levocetirizine (XYZAL) 5 MG tablet, Take 1 tablet by mouth nightly, Disp: 30 tablet, Rfl: 11    topiramate (TOPAMAX) 100 MG tablet, Take 1 tablet by mouth daily, Disp: 30 tablet, Rfl: 0    buPROPion (WELLBUTRIN XL) 150 MG extended release tablet, Take 1 tablet by mouth every morning, Disp: 30 tablet, Rfl: 0    meloxicam (MOBIC) 15 MG tablet, Take 1 tablet by mouth daily, Disp: 30 tablet, Rfl: 10    omeprazole (PRILOSEC) 40 MG delayed release capsule, Take 1 capsule by mouth 2 times daily, Disp: 60 capsule, Rfl: 1    mometasone-formoterol (DULERA) 200-5 MCG/ACT inhaler, Inhale 2 puffs into the lungs 2 times daily, Disp: 1 Inhaler, Rfl: 11    montelukast (SINGULAIR) 10 MG tablet, Take 1 tablet by mouth daily, Disp: 30 tablet, Rfl: 3    dicyclomine (BENTYL) 10 MG capsule, 1-2 po qac prn to prevent diarrhea, Disp: 120 capsule, Rfl: 0    budesonide-formoterol (SYMBICORT) 160-4.5 MCG/ACT AERO, Inhale 2 puffs into the lungs 2 times daily, Disp: 1 Inhaler, Rfl: 11    albuterol sulfate HFA (PROAIR HFA) 108 (90 Base) MCG/ACT inhaler, Use 1-2 puffs every 4 hours while awake for 3 days then PRN wheezing/cough. Dispense with SPACER and Instruct on use. May sub Ventolin or Proventil as needed per Insurance., Disp: 1 Inhaler, Rfl: 1    acyclovir (ZOVIRAX) 400 MG tablet, Take 400 mg by mouth daily. , Disp: , Rfl:     SUMAtriptan (IMITREX) 100 MG tablet, Take 1 tablet by mouth once as needed for Migraine Repeat in 2 hrs, maximum 2 pills within 24hr, Disp: 9 tablet, Rfl: 0    Compazine [prochlorperazine];  Iv dye [iodides]; and Toradol [ketorolac tromethamine]    Vitals:    05/30/19 1144   BP: 118/72   Site: Left Upper Arm Position: Sitting   Cuff Size: Large Adult   Pulse: 78   Resp: 16   SpO2: 97%   Weight: (!) 325 lb (147.4 kg)   Height: 6' 1\" (1.854 m)       Review of Systems   Constitutional: Negative for chills, fever and unexpected weight change. HENT: Negative for mouth sores, sore throat and voice change. Eyes: Negative for pain, discharge and itching. Respiratory: Positive for cough and shortness of breath. Negative for choking, chest tightness, wheezing and stridor. Cardiovascular: Negative for chest pain, palpitations and leg swelling. Gastrointestinal: Negative for abdominal pain, constipation and diarrhea. Endocrine: Negative for cold intolerance, heat intolerance and polydipsia. Genitourinary: Negative for dysuria, frequency and hematuria. Musculoskeletal: Negative for gait problem, joint swelling and neck stiffness. Neurological: Negative for dizziness, numbness and headaches. Psychiatric/Behavioral: Negative for agitation, confusion and hallucinations. Physical Exam   Constitutional: She appears well-developed and well-nourished. No distress. HENT:   Head: Normocephalic and atraumatic. Mouth/Throat: Oropharynx is clear and moist. No oropharyngeal exudate. Eyes: Pupils are equal, round, and reactive to light. EOM are normal.   Neck: Neck supple. No JVD present. Cardiovascular: Normal heart sounds. Exam reveals no gallop and no friction rub. No murmur heard. Pulmonary/Chest: Effort normal. She has wheezes. She has no rales. Equal chest rise and expansion bilaterally   Abdominal: Soft. Bowel sounds are normal. She exhibits no distension. There is no tenderness. Musculoskeletal: Normal range of motion. She exhibits no edema. Lymphadenopathy:     She has no cervical adenopathy. Neurological: She is alert. No cranial nerve deficit. CN 2-12 grossly intact   Skin: Skin is warm and dry. No rash noted. She is not diaphoretic.      CXR and prior CT chest personally reviewed,

## 2019-06-05 ENCOUNTER — HOSPITAL ENCOUNTER (OUTPATIENT)
Age: 44
Setting detail: SPECIMEN
Discharge: HOME | End: 2019-06-05
Payer: COMMERCIAL

## 2019-06-05 DIAGNOSIS — R19.7 DIARRHEA, UNSPECIFIED TYPE: ICD-10-CM

## 2019-06-05 PROCEDURE — 83993 ASSAY FOR CALPROTECTIN FECAL: CPT

## 2019-06-05 PROCEDURE — 87338 HPYLORI STOOL AG IA: CPT

## 2019-06-07 LAB
CALPROTECTIN: 19 UG/G
H PYLORI ANTIGEN STOOL: NEGATIVE

## 2019-06-10 ENCOUNTER — OFFICE VISIT (OUTPATIENT)
Dept: GASTROENTEROLOGY | Age: 44
End: 2019-06-10
Payer: COMMERCIAL

## 2019-06-10 ENCOUNTER — TELEPHONE (OUTPATIENT)
Dept: GASTROENTEROLOGY | Age: 44
End: 2019-06-10

## 2019-06-10 VITALS
BODY MASS INDEX: 39.68 KG/M2 | HEIGHT: 72 IN | WEIGHT: 293 LBS | DIASTOLIC BLOOD PRESSURE: 78 MMHG | SYSTOLIC BLOOD PRESSURE: 124 MMHG

## 2019-06-10 DIAGNOSIS — K62.5 RECTAL BLEEDING: Primary | ICD-10-CM

## 2019-06-10 DIAGNOSIS — R12 HEARTBURN: ICD-10-CM

## 2019-06-10 PROCEDURE — G8427 DOCREV CUR MEDS BY ELIG CLIN: HCPCS | Performed by: INTERNAL MEDICINE

## 2019-06-10 PROCEDURE — 4004F PT TOBACCO SCREEN RCVD TLK: CPT | Performed by: INTERNAL MEDICINE

## 2019-06-10 PROCEDURE — 99214 OFFICE O/P EST MOD 30 MIN: CPT | Performed by: INTERNAL MEDICINE

## 2019-06-10 PROCEDURE — G8417 CALC BMI ABV UP PARAM F/U: HCPCS | Performed by: INTERNAL MEDICINE

## 2019-06-10 RX ORDER — METOCLOPRAMIDE 10 MG/1
10 TABLET ORAL EVERY 6 HOURS PRN
Qty: 20 TABLET | Refills: 0 | Status: SHIPPED | OUTPATIENT
Start: 2019-06-10 | End: 2020-07-09

## 2019-06-10 RX ORDER — POLYETHYLENE GLYCOL 3350 17 G/17G
238 POWDER ORAL DAILY
Qty: 255 G | Refills: 0 | Status: ON HOLD | OUTPATIENT
Start: 2019-06-10 | End: 2019-07-30 | Stop reason: ALTCHOICE

## 2019-06-10 NOTE — TELEPHONE ENCOUNTER
Patient needs to be scheduled for colon/egd w [facility]. Only day that patient is able to is wednesdays patient informed Dr. Isidro Clements is out of the office on that day she was offered to be switched to Dr. Olivier Azevedo schedule okay per Dr. Isidro Clements however patient would like to stay with Dr. Isidro Clements . Patient to check with family/friends to see what day she could possibly schedule. Patient to [facility] tomorrow.

## 2019-06-10 NOTE — PATIENT INSTRUCTIONS
Marisol Hutchins[address] ,  [address] [facility]  Phone: [phone] [facility] [address], [address] [facility], [facility]  Phone: [phone]    Sedation  Three types of sedation are used for endoscopy and colonoscopy. The standard and most common is called conscious sedation. This is administered by the gastroenterologist and is part of the standard procedure. Common medications used for this are IV forms of a benzodiazepine (most commonly Versed) and a narcotic (most commonly fentanyl). Benadryl and nausea medicines may also be used. The effect of this is to make you comfortable. Most people will actually have amnesia with this and not recall the procedure. Some individuals will have other types of anesthesia provided by an anesthesiologist or nurse anesthetist.  The reason for this is a history of poor sedation, medication use that makes one more resistant to conscious sedation, a medical condition for which conscious sedation is contraindicated or other medical unstable conditions. This usually involves a separate fee from anesthesia. The most common of these is propofol (diprivan sedation) which is a deeper sedative the conscious sedation. In some instances, general anesthesia with intubation (breathing tube) is required. If you need to cancel or reschedule, please do so at least 2 weeks before the procedure, so that we can be considerate to other patients who are waiting to be scheduled.     If you cancel or reschedule less than 7 days before your procedure, you will be placed on a lower priority list.    ENDOSCOPY OVERVIEW  An upper endoscopy, often referred to as endoscopy, EGD, or iwnfvpei-atflui-gqdaggwdhpvz, is a procedure that allows a physician to directly examine the upper part of the gastrointestinal (GI) tract, which includes the esophagus (swallowing tube), the stomach, and the duodenum (the first section of the small intestine)  The physician who performs the procedures, known as an endoscopist, has special training in using an endoscope to examine the upper GI system, looking for inflammation (redness, irritation), bleeding, ulcers, or tumors. REASONS FOR UPPER ENDOSCOPY  The most common reasons for upper endoscopy include:  Unexplained discomfort in the upper abdomen   GERD or gastroesophageal reflux disease, (often called heartburn)   Persistent nausea and vomiting   Upper GI bleeding (vomiting blood or blood found in the stool that originated from the upper part of the gastrointestinal tract). Bleeding can be treated during the endoscopy. Difficulty swallowing; food/liquids getting stuck in the esophagus during swallowing. This may be caused by a narrowing (stricture) or tumor. The stricture may be dilated with special balloons or dilation tubes during the endoscopy. Abnormal or unclear findings on an upper GI x-ray, CT scan or MRI. Removal of a foreign body (a swallowed object). To check healing or progress on previously found polyps (growths), tumors, or ulcers. ENDOSCOPY PREPARATION  You will be given specific instructions regarding how to prepare for the examination before the procedure. These instructions are designed to maximize your safety during and after the examination and to minimize possible complications. It is important to read the instructions ahead of time and follow them carefully. Do not hesitate to call the physician's office or the endoscopy unit if there are questions. Nothing to eat after midnight the day before the test. You may be asked not to eat or drink anything for up to eight hours before the test. It is important for your stomach to be empty to allow the endoscopist to visualize the entire area and to decrease the possibility of food or fluid being vomited into the lungs while under sedation (called aspiration).   You may be asked to adjust the dose of your medications or to stop specific medications (such as aspirin-like drugs) temporarily before the examination. You should discuss your medications with your physician before your appointment for the endoscopy. You should arrange for a friend or family member to escort you home after the examination. Although you will be awake by the time you are discharged, the medications used for sedation cause temporary changes in the reflexes and judgment and interfere with your ability to drive or make decisions (similar to the effects of alcohol). WHAT TO EXPECT DURING ENDOSCOPY  Prior to the endoscopy, the staff will review your medical and surgical history, including current medications. A physician will explain the procedure and ask you to sign a consent. Before signing the consent, you should understand all the benefits and risks of the procedure, and should have all of your questions answered. An intravenous line (a needle inserted into a vein in the hand or arm) will be started to deliver medications. You will be given a combination of a sedative (to help you relax), and a narcotic (to prevent discomfort). Although most patients are sedated for the examination, many tolerate the procedure well without any medication. Your vital signs (blood pressure, heart rate, and blood oxygen level) will be monitored before, during, and after the examination. The monitoring is not painful. Oxygen is often given during the procedure through a small tube that sits under the nose and is fitted around the ears. For safety reasons, dentures should be removed before the procedure. THE ENDOSCOPY PROCEDURE  The procedure typically takes between 10 and 20 minutes to complete. The endoscopy is performed while you lie on your left side. Sometimes the physician will give a medication to numb the throat (either a gargle or a spray). A plastic mouth guard is placed between the teeth to prevent damage to the teeth and scope.   The endoscope (also called a healthcare provider is the best source of information for questions and concerns related to your medical problem. The following organizations also provide reliable health information. [employer] of [employer] (www.nlm.nih.gov/medlineplus/healthtopics. html)  The American Society of Gastrointestinal Endoscopy: (www.askasge. org)  [employer] of Diabetes and Digestive and Kidney Diseases (http://digestive. niddk.nih.gov/ddiseases/pubs/upperendoscopy/index. htm)  ______________________________________________________________________________________________________________________________________    COLONOSCOPY OVERVIEW  A colonoscopy is an exam of the lower part of the gastrointestinal tract, which is called the colon or large intestine (bowel). Colonoscopy is a safe procedure that provides information other tests may not be able to give. Patients who require colonoscopy often have questions and concerns about the procedure. Colonoscopy is performed by inserting a device called a colonoscope into the anus and advanced through the entire colon. The procedure generally takes between 20 minutes and one hour. Other tests that are sometimes used to screen for colon cancer, like virtual colonoscopy (also called CT colonography), are discussed separately. More detailed information about colonoscopy is available by subscription.     REASONS FOR COLONOSCOPY   The most common reasons for colonoscopy are to evaluate the following:        As a screening exam for colon cancer      Rectal bleeding      A change in bowel habits, like persistent diarrhea      Iron deficiency anemia (a decrease in blood count due to loss of iron)      A family history of colon cancer      As a follow-up test in people with colon polyps or colon cancer      Chronic, unexplained abdominal or rectal pain      An abnormal X-ray exam, like a barium enema or CT scan    COLONOSCOPY PREPARATION  Before colonoscopy, your colon must be completely cleaned out so that the doctor can see any abnormal areas. To clean the colon, you will take a strong laxative and empty your bowels the night before your test.    Your doctor's office will provide specific instructions about how you should prepare for colonoscopy. Be sure to read these instructions ahead of time so you will be prepared for the prep. If you have questions, call the doctor's office in advance. You will need to avoid solid food for at least one day before the test. You should also drink plenty of fluids on the day before the test. You can drink clear liquids up to several hours before your procedure, including:        Water      Clear broth (beef, chicken, or vegetable)      Coffee or tea (without milk)      Ices      Gelatin (avoid red gelatin)    The day or night before the colonoscopy, you will take a laxative in two parts:        A pill that you take by mouth      A powder that is mixed with water    The most common laxative treatment is called Go-Lytely® or Half-Lytely®. You can add a flavoring (included), which, unfortunately, only partially hides the unpleasant taste. Most doctors do not recommend that you add other flavorings to the solution. Refrigerating the solution can make it easier to drink. Drinking this solution may be the most unpleasant part of the exam. You will begin to have watery diarrhea within a short time after drinking the solution. If you become nauseated or vomit while drinking the solution, call your doctor or nurse for instructions. Medicines - You can take most prescription and nonprescription medicines right up to the day of the colonoscopy. Your doctor should tell you what medicines to stop. You should also tell the doctor if you are allergic to any medicines. Some medicines increase the risk of heavy bleeding if you have a biopsy during the colonoscopy.  Ask your doctor how and when to stop these medicines, including warfarin/Coumadin® and doctor when it is safe to restart aspirin and other blood-thinning medications. COLONOSCOPY COMPLICATIONS  Colonoscopy is a safe procedure, and complications are rare but can occur:        Bleeding can occur from biopsies or the removal of polyps, but it is usually minimal and can be controlled. The colonoscope can cause a tear or hole (perforation) in the colon. This is a serious problem, but it does not happen commonly. It is possible to have side effects from the sedative medicines. Although colonoscopy is the best test to examine the colon, it is possible for even the most skilled doctors to miss or overlook an abnormal area in the colon. You should call your doctor immediately if you have any of the following:        Severe abdominal pain (not just gas cramps)      A firm, bloated abdomen      Vomiting      Fever      Rectal bleeding (greater than a few tablespoons)    AFTER COLONOSCOPY  Although many people worry about being uncomfortable during a colonoscopy, most people tolerate it very well and feel fine afterward. It is normal to feel tired afterward. Plan to take it easy and relax the rest of the day. Your doctor can describe the results of the colonoscopy as soon as it is over. If s/he took biopsies or polyps, you should call for results within one to two weeks. We will make every attempt to get you your results by phone, mychart or sometimes a follow up visit, however, It is your responsibility to obtain your test results. If you do not get your results within 2 weeks, please call the office. Not all test results are available during your visit. If your test results are not back during the visit and you are still having symptoms, make an appointment with your caregiver to find out the results and any next steps. Do not assume everything is normal if you have not heard from your caregiver or the medical facility. It is important for you to follow up on all of your test results. WHERE TO GET MORE INFORMATION  Your healthcare provider is the best source of information for questions and concerns related to your medical problem. This article will be updated as needed every four months on our Web site ([email or web address]). The following organizations also provide reliable health information. [employer] of [employer] (www.nlm.nih.gov/medlineplus/colonoscopy.html)  [address] for Gastrointestinal Endoscopy  (www.asge.org/PatientInfoIndex. aspx?kr=623)

## 2019-06-10 NOTE — PROGRESS NOTES
[address] [facility],  [address] [place] [address] Ave  [place], [address] [place] [address]  Phone: [phone]   [facility]     Chief Complaint   Patient presents with    1 Month Follow-Up     pt c/o rectal bleeding ongoing for 5 days       HPI     Thank you SHAUNNA APARICIO MD for asking me to see Skyla Marti in consultation. She is a Single [1] White [1] 38 y.o. Bina Schroeder female seen independently who presents with the following GI complaints: Bina Schroeder [place]  Complains of ongoing heartburn although a little better on bid ppi therapy. Indicates not drinking carbonated beverages and counting calories but still can't lose weight. No further diarrhea but has had 5 days of consistent rectal bleeding along with rectal pain and swelling. Indicates previous hemorrhoid problems. Fecal calprotectin and h pylori negative in the last week. HPI elements: location, severity, timing, modifying factors, quality, duration, context and associated signs/symptoms. Last Encounter Reviewed: 5/17/2019  Drema [place]  Has refractory heartburn on prilosec for year and recently changed to protonix. Had not tried prilosec bid. Also was drinking carbonated beverages on previous prilosec. Ulcers run in the family  Denies asa use but takes mobic daily. Has frequent diarrhea worse postprandially without bleeding. Bentyl and imodium help only a little. Has had bidirectional endoscopy in the past a long time ago.     Last Encounter Reviewed:   Pertinent PMH, FH, SH is reviewed below. Last EGD: years ago  Last Colonoscopy: years ago      Review of available records reveals:   Wt Readings from Last 50 Encounters:   06/10/19 (!) 330 lb (149.7 kg)   05/30/19 (!) 325 lb (147.4 kg)   05/21/19 (!) 325 lb (147.4 kg)   05/20/19 (!) 326 lb 1 oz (147.9 kg)   05/17/19 (!) 326 lb (147.9 kg)   05/08/19 (!) 335 lb (152 kg)   05/07/19 (!) 336 lb (152.4 kg)   05/06/19 (!) 340 lb (154.2 kg)       No components found for: HGBA1C  BP Readings from Last 3 Encounters:   06/10/19 124/78   05/30/19 118/72   05/21/19 102/70     Health Maintenance   Topic Date Due    HIV screen  04/29/1989    Cervical cancer screen  04/29/1995    Flu vaccine (Season Ended) 09/01/2019    DTaP/Tdap/Td vaccine (2 - Td) 01/01/2022    Diabetes screen  05/17/2022    Lipid screen  05/17/2024    Pneumococcal 0-64 years Vaccine  Completed       No components found for: [address] HISTORY     Past Medical History:   Diagnosis Date    Asthma     COPD with acute exacerbation ([facility] [address] 6/30/2015    Endometriosis     Fibromyalgia     Gastroparesis     GERD (gastroesophageal reflux disease)     Irritable bowel syndrome     Migraines     Osteoarthritis     Psychiatric problem      FAMILY HISTORY     Family History   Problem Relation Age of Onset    Depression Mother     Mental Illness Mother     Cancer Mother         skin    Rheum Arthritis Mother     High Blood Pressure Mother     Arthritis Father     Other Father         Bleeding ulcer    Blindness Father     High Blood Pressure Maternal Grandmother     Stroke Maternal Grandmother     Cancer Maternal Grandfather         Leukemia    Dementia Paternal Grandmother     Stroke Paternal Grandfather     High Blood Pressure Paternal Grandfather     Breast Cancer Other      SOCIAL HISTORY     Social History     Socioeconomic History    Marital status: Single     Spouse name: Not on file    Number of children: Not on file    Years of education: Not on file    Highest education level: Not on file   Occupational History    Occupation: unemployed   Social Needs    Financial resource strain: Not on file    Food insecurity:     Worry: Not on file     Inability: Not on file    Transportation needs:     Medical: Not on file     Non-medical: Not on file   Tobacco Use    Smoking status: Heavy Tobacco Smoker     Packs/day: 1.00     Years: 20.00     Pack years: 20.00     Start date: 6/29/1995   Zina Smokeless tobacco: Never Used   Substance and Sexual Activity    Alcohol use: Yes     Comment: monthly    Drug use: No    Sexual activity: Not Currently     Partners: Male   Lifestyle    Physical activity:     Days per week: Not on file     Minutes per session: Not on file    Stress: Not on file   Relationships    Social connections:     Talks on phone: Not on file     Gets together: Not on file     Attends Scientologist service: Not on file     Active member of club or organization: Not on file     Attends meetings of clubs or organizations: Not on file     Relationship status: Not on file    Intimate partner violence:     Fear of current or ex partner: Not on file     Emotionally abused: Not on file     Physically abused: Not on file     Forced sexual activity: Not on file   Other Topics Concern    Not on file   Social History Narrative    Not on file     SURGICAL HISTORY     Past Surgical History:   Procedure Laterality Date    COLONOSCOPY      DILATATION, ESOPHAGUS      ENDOMETRIAL ABLATION      ENDOSCOPY, COLON, DIAGNOSTIC      SALPINGO-OOPHORECTOMY Left 2009     CURRENT MEDICATIONS   (This list may include medications prescribed during this encounter as epic can not insert only the list prior to this encounter.)  Current Outpatient Rx   Medication Sig Dispense Refill    bisacodyl (DULCOLAX) 5 MG EC tablet Take 1 tablet by mouth daily as needed (as directed for colon prep) Take as directed for colonoscopy.  4 tablet 0    polyethylene glycol (MIRALAX) POWD powder Take 238 g by mouth daily Take as directed for colonoscopy 255 g 0    metoclopramide (REGLAN) 10 MG tablet Take 1 tablet by mouth every 6 hours as needed (nausea) 20 tablet 0    fluticasone (FLONASE) 50 MCG/ACT nasal spray 2 sprays by Each Nostril route daily 2 Sprays in each nostril 1 Bottle 11    citalopram (CELEXA) 40 MG tablet Take 1 tablet by mouth daily 30 tablet 10    levocetirizine (XYZAL) 5 MG tablet Take 1 tablet by mouth nightly 30 tablet 11    topiramate (TOPAMAX) 100 MG tablet Take 1 tablet by mouth daily 30 tablet 0    buPROPion (WELLBUTRIN XL) 150 MG extended release tablet Take 1 tablet by mouth every morning 30 tablet 0    meloxicam (MOBIC) 15 MG tablet Take 1 tablet by mouth daily 30 tablet 10    omeprazole (PRILOSEC) 40 MG delayed release capsule Take 1 capsule by mouth 2 times daily 60 capsule 1    mometasone-formoterol (DULERA) 200-5 MCG/ACT inhaler Inhale 2 puffs into the lungs 2 times daily 1 Inhaler 11    montelukast (SINGULAIR) 10 MG tablet Take 1 tablet by mouth daily 30 tablet 3    dicyclomine (BENTYL) 10 MG capsule 1-2 po qac prn to prevent diarrhea 120 capsule 0    budesonide-formoterol (SYMBICORT) 160-4.5 MCG/ACT AERO Inhale 2 puffs into the lungs 2 times daily 1 Inhaler 11    albuterol sulfate HFA (PROAIR HFA) 108 (90 Base) MCG/ACT inhaler Use 1-2 puffs every 4 hours while awake for 3 days then PRN wheezing/cough. Dispense with SPACER and Instruct on use. May sub Ventolin or Proventil as needed per [employer]. 1 Inhaler 1    acyclovir (ZOVIRAX) 400 MG tablet Take 400 mg by mouth daily.  SUMAtriptan (IMITREX) 100 MG tablet Take 1 tablet by mouth once as needed for Migraine Repeat in 2 hrs, maximum 2 pills within 24hr 9 tablet 0     ALLERGIES     Allergies   Allergen Reactions    Compazine [Prochlorperazine] Anaphylaxis    Iv Dye [Iodides] Hives and Itching    Toradol [Ketorolac Tromethamine]      IMMUNIZATIONS     Immunization History   Administered Date(s) Administered    Pneumococcal Polysaccharide ([id number]) 05/08/2019    Tdap (Boostrix, Adacel) 01/01/2012     REVIEW OF SYSTEMS   See HPI for further details and pertinent postiives. Negative for the following:  Constitutional: Negative for weight change. Negative for appetite change and fatigue. HENT: Negative for nosebleeds, sore throat, mouth sores, and voice change. Respiratory: Negative for cough, choking and chest tightness. High risk endoscopic procedures that require stopping antiplatelet and anticoagulation therapy include polypectomy, biliary or pancreatic sphincterotomy, pneumatic or bougie dilation, PEG placement, therapeutic balloon-assisted enteroscopy, EUS and FNA, tumor ablation by any technique,       cystogastrostomy,and treatment of varices. Order Specific Question:   Screening or Diagnostic? Answer:   Diagnostic    EGD     Scheduling Instructions:      Schedule with anesthesia provided diprivan sedation. Please provide prep of choice instructions and prescription. General guidelines for holding blood thinners/anticoagulants around endoscopic procedure are but patients are encouraged to check with their prescribing physician. The patient may hold Plavix, Effient, Brilinta 5 days prior to the procedure unless:       A drug eluting stent has been placed within past 12 months. A nondrug eluting stent has been placed within past 1 month. Coumadin may be held 4 days prior to the procedure unless:        Mechanical mitral valve replacement (requires heparin bridge while Coumadin held and is managed by pharmacy)      Pradaxa, Xarelto, Eliquis may be held 2-3 days prior to procedure. According to pharmacokinetics of the drug, package insert, cardiology practice patterns, and T1/2 of theses drugs (12 hrs), Eliquis and Xarelto are held 48hrs prior to any procedure, including major surgical procedures w/o       increased bleeding.  That is usually the standard of care, as coagulation would/should be normalized at 48hrs. Every attempt should be made to maintain ASA 81mg per day throughout the brooke-operative period in patients with diagnosis of ASHD. These recommendations may need to be modified by the provider/ based on risk /benefit analysis of the procedure and the patients history.             If anticoagulation can not be held because recent cardiac PM    CC:   Daniela Loyola MD

## 2019-06-18 ENCOUNTER — TELEPHONE (OUTPATIENT)
Dept: PULMONOLOGY | Age: 44
End: 2019-06-18

## 2019-06-25 ENCOUNTER — OFFICE VISIT (OUTPATIENT)
Dept: INTERNAL MEDICINE CLINIC | Age: 44
End: 2019-06-25
Payer: COMMERCIAL

## 2019-06-25 VITALS
WEIGHT: 293 LBS | DIASTOLIC BLOOD PRESSURE: 66 MMHG | SYSTOLIC BLOOD PRESSURE: 128 MMHG | HEART RATE: 84 BPM | OXYGEN SATURATION: 98 % | HEIGHT: 72 IN | BODY MASS INDEX: 39.68 KG/M2

## 2019-06-25 DIAGNOSIS — F41.9 ANXIETY AND DEPRESSION: Primary | ICD-10-CM

## 2019-06-25 DIAGNOSIS — R51.9 HEADACHE DISORDER: ICD-10-CM

## 2019-06-25 DIAGNOSIS — B00.2 PRIMARY HSV INFECTION OF MOUTH: ICD-10-CM

## 2019-06-25 DIAGNOSIS — F32.A ANXIETY AND DEPRESSION: Primary | ICD-10-CM

## 2019-06-25 PROCEDURE — 4004F PT TOBACCO SCREEN RCVD TLK: CPT | Performed by: INTERNAL MEDICINE

## 2019-06-25 PROCEDURE — G8417 CALC BMI ABV UP PARAM F/U: HCPCS | Performed by: INTERNAL MEDICINE

## 2019-06-25 PROCEDURE — G8427 DOCREV CUR MEDS BY ELIG CLIN: HCPCS | Performed by: INTERNAL MEDICINE

## 2019-06-25 PROCEDURE — 99214 OFFICE O/P EST MOD 30 MIN: CPT | Performed by: INTERNAL MEDICINE

## 2019-06-25 RX ORDER — VALACYCLOVIR HYDROCHLORIDE 1 G/1
2000 TABLET, FILM COATED ORAL 2 TIMES DAILY
Qty: 8 TABLET | Refills: 0 | Status: SHIPPED | OUTPATIENT
Start: 2019-06-25

## 2019-06-25 RX ORDER — TOPIRAMATE 100 MG/1
100 TABLET, FILM COATED ORAL 2 TIMES DAILY
Qty: 60 TABLET | Refills: 0 | Status: SHIPPED | OUTPATIENT
Start: 2019-06-25 | End: 2019-08-15 | Stop reason: SDUPTHER

## 2019-06-25 RX ORDER — BUPROPION HYDROCHLORIDE 300 MG/1
300 TABLET ORAL EVERY MORNING
Qty: 30 TABLET | Refills: 0 | Status: SHIPPED | OUTPATIENT
Start: 2019-06-25 | End: 2019-08-15 | Stop reason: SDUPTHER

## 2019-06-25 NOTE — PROGRESS NOTES
Trino Hernandez  YOB: 1975    Date of Service:  6/25/2019    Chief Complaint:      Chief Complaint   Patient presents with    Headache    Depression    Allergies       HPI:  Trino Hernandez is a 38 y.o. Headache behind her eyes and forehead improved from almost day to 2-3 times a week only lasting 2 hrs without any medications on topamax 100 mg qd.  She gets nauseas and photophobia with headaches. Anxiety/Depression:  stable and still feel stress with things not going well with her boyfriend and her health so not sure if Wellbutrin  mg qd is helping, no side effects and still on Celexa 40 mg qd. She does feel a little better mostly because she's able to lose some weight recently. Depression due to downturn in her health and chronic arthritis pain.  She's been on Celexa for a long time from previous PCP. Allergies:  Still having allergy symptoms despite being on Flonase, Xyzal and Singulair. She just got referred to an allergist per Dr. Chanell Howell. GERD:  Still some heartburn that results in nausea 5 days a week despite being back on Prilosec 40 mg qd per GI.    Bilateral knee, ankles and feet for the past 10 years.  Have improved on Mobic 15 mg qd but GI wants her off and use ibuprofen instead. Had negative w/u for RA since FH RA.   Diarrhea improved since started Bentyl 10 mg bid with lunch and dinner.     Recurrent oral HSV:  Been on acyclovir 400 mg qd as a prevention  The 10-year ASCVD risk score (Wanda Zhou et al., 2013) is: 2.3%    Values used to calculate the score:      Age: 38 years      Sex: Female      Is Non- : No      Diabetic: No      Tobacco smoker: Yes      Systolic Blood Pressure: 470 mmHg      Is BP treated: No      HDL Cholesterol: 52 mg/dL      Total Cholesterol: 169 mg/dL    Lab Results   Component Value Date    LABA1C 5.1 05/17/2019    LABMICR Yes 06/29/2015     Lab Results   Component Value Date     05/06/2019    K 3.5 05/06/2019    CL 50 MCG/ACT nasal spray 2 sprays by Each Nostril route daily 2 Sprays in each nostril 1 Bottle 11    citalopram (CELEXA) 40 MG tablet Take 1 tablet by mouth daily 30 tablet 10    levocetirizine (XYZAL) 5 MG tablet Take 1 tablet by mouth nightly 30 tablet 11    topiramate (TOPAMAX) 100 MG tablet Take 1 tablet by mouth daily 30 tablet 0    buPROPion (WELLBUTRIN XL) 150 MG extended release tablet Take 1 tablet by mouth every morning 30 tablet 0    meloxicam (MOBIC) 15 MG tablet Take 1 tablet by mouth daily 30 tablet 10    mometasone-formoterol (DULERA) 200-5 MCG/ACT inhaler Inhale 2 puffs into the lungs 2 times daily 1 Inhaler 11    montelukast (SINGULAIR) 10 MG tablet Take 1 tablet by mouth daily 30 tablet 3    dicyclomine (BENTYL) 10 MG capsule 1-2 po qac prn to prevent diarrhea 120 capsule 0    budesonide-formoterol (SYMBICORT) 160-4.5 MCG/ACT AERO Inhale 2 puffs into the lungs 2 times daily 1 Inhaler 11    albuterol sulfate HFA (PROAIR HFA) 108 (90 Base) MCG/ACT inhaler Use 1-2 puffs every 4 hours while awake for 3 days then PRN wheezing/cough. Dispense with SPACER and Instruct on use. May sub Ventolin or Proventil as needed per [employer]. 1 Inhaler 1    acyclovir (ZOVIRAX) 400 MG tablet Take 400 mg by mouth daily. Review of Systems: 14 systems were negative except of what was stated on HPI    Nursing note and vitals reviewed. Vitals:    06/25/19 1300   BP: 128/66   Pulse: 84   SpO2: 98%   Weight: (!) 324 lb (147 kg)   Height: 6' 1\" (1.854 m)     Wt Readings from Last 3 Encounters:   06/25/19 (!) 324 lb (147 kg)   06/10/19 (!) 330 lb (149.7 kg)   05/30/19 (!) 325 lb (147.4 kg)     BP Readings from Last 3 Encounters:   06/25/19 128/66   06/10/19 124/78   05/30/19 118/72     Body mass index is 42.75 kg/m². Constitutional: Patient appears well-developed and well-nourished. No distress. Head: Normocephalic and atraumatic. Neck: Normal range of motion. Neck supple. No thyroidmegaly. Cardiovascular: Normal rate, regular rhythm, normal heart sounds and intact distal pulses. Pulmonary/Chest: Effort normal and breath sounds normal. No stridor. No respiratory distress. No wheezes and no rales. Abdominal: Soft. Bowel sounds are normal. No distension and no mass. No tenderness. No rebound and no guarding. Musculoskeletal: No edema and no tenderness. Skin: No rash or erythema. Psychiatric: Normal mood and affect. Behavior is normal.     Assessment/Plan:  Deepak Collier was seen today for headache, depression and allergies. Diagnoses and all orders for this visit:    Anxiety and depression  Increase buPROPion (WELLBUTRIN XL) 300 MG extended release tablet; Take 1 tablet by mouth every morning    Headache disorder  Increase  topiramate (TOPAMAX) 100 MG tablet; Take 1 tablet by mouth 2 times daily    Primary HSV infection of mouth  Start valACYclovir (VALTREX) 1 g tablet; Take 2 tablets by mouth 2 times daily        Return 1 month on Anxiety/depression, HA and HSV.

## 2019-07-16 ENCOUNTER — OFFICE VISIT (OUTPATIENT)
Dept: PSYCHOLOGY | Age: 44
End: 2019-07-16
Payer: COMMERCIAL

## 2019-07-16 DIAGNOSIS — F33.1 MDD (MAJOR DEPRESSIVE DISORDER), RECURRENT EPISODE, MODERATE (HCC): ICD-10-CM

## 2019-07-16 DIAGNOSIS — F41.1 GAD (GENERALIZED ANXIETY DISORDER): Primary | ICD-10-CM

## 2019-07-16 PROCEDURE — 4004F PT TOBACCO SCREEN RCVD TLK: CPT | Performed by: SOCIAL WORKER

## 2019-07-16 PROCEDURE — 90832 PSYTX W PT 30 MINUTES: CPT | Performed by: SOCIAL WORKER

## 2019-07-16 NOTE — PROGRESS NOTES
volume  Mood    Anxious  Depressed  Affect    anxiety  Thought Content    cognitive distortions  Thought Process    linear, goal directed and coherent  Associations    logical connections  Insight    Good  Judgment    Intact  Orientation    oriented to person, place, time, and general circumstances  Memory    recent and remote memory intact  Attention/Concentration    intact  Morbid ideation No  Suicide Assessment    no suicidal ideation      History:    Medications:   Current Outpatient Medications   Medication Sig Dispense Refill    buPROPion (WELLBUTRIN XL) 300 MG extended release tablet Take 1 tablet by mouth every morning 30 tablet 0    topiramate (TOPAMAX) 100 MG tablet Take 1 tablet by mouth 2 times daily 60 tablet 0    valACYclovir (VALTREX) 1 g tablet Take 2 tablets by mouth 2 times daily 8 tablet 0    bisacodyl (DULCOLAX) 5 MG EC tablet Take 1 tablet by mouth daily as needed (as directed for colon prep) Take as directed for colonoscopy.  4 tablet 0    polyethylene glycol (MIRALAX) POWD powder Take 238 g by mouth daily Take as directed for colonoscopy 255 g 0    metoclopramide (REGLAN) 10 MG tablet Take 1 tablet by mouth every 6 hours as needed (nausea) 20 tablet 0    fluticasone (FLONASE) 50 MCG/ACT nasal spray 2 sprays by Each Nostril route daily 2 Sprays in each nostril 1 Bottle 11    citalopram (CELEXA) 40 MG tablet Take 1 tablet by mouth daily 30 tablet 10    levocetirizine (XYZAL) 5 MG tablet Take 1 tablet by mouth nightly 30 tablet 11    meloxicam (MOBIC) 15 MG tablet Take 1 tablet by mouth daily 30 tablet 10    omeprazole (PRILOSEC) 40 MG delayed release capsule Take 1 capsule by mouth 2 times daily 60 capsule 1    mometasone-formoterol (DULERA) 200-5 MCG/ACT inhaler Inhale 2 puffs into the lungs 2 times daily 1 Inhaler 11    montelukast (SINGULAIR) 10 MG tablet Take 1 tablet by mouth daily 30 tablet 3    SUMAtriptan (IMITREX) 100 MG tablet Take 1 tablet by mouth once as needed for

## 2019-07-19 ENCOUNTER — HOSPITAL ENCOUNTER (OUTPATIENT)
Dept: CT IMAGING | Age: 44
Discharge: HOME | End: 2019-07-19
Payer: COMMERCIAL

## 2019-07-19 ENCOUNTER — HOSPITAL ENCOUNTER (OUTPATIENT)
Dept: NON INVASIVE DIAGNOSTICS | Age: 44
Discharge: HOME | End: 2019-07-19
Payer: COMMERCIAL

## 2019-07-19 DIAGNOSIS — R06.02 SHORTNESS OF BREATH: ICD-10-CM

## 2019-07-19 DIAGNOSIS — R07.89 OTHER CHEST PAIN: ICD-10-CM

## 2019-07-19 LAB
LV EF: 55 %
LVEF MODALITY: NORMAL

## 2019-07-19 PROCEDURE — 93306 TTE W/DOPPLER COMPLETE: CPT

## 2019-07-23 RX ORDER — ALBUTEROL SULFATE 90 UG/1
AEROSOL, METERED RESPIRATORY (INHALATION)
Qty: 1 INHALER | Refills: 5 | Status: SHIPPED | OUTPATIENT
Start: 2019-07-23 | End: 2020-03-20 | Stop reason: SDUPTHER

## 2019-07-25 ENCOUNTER — TELEPHONE (OUTPATIENT)
Dept: INTERNAL MEDICINE CLINIC | Age: 44
End: 2019-07-25

## 2019-07-25 NOTE — LETTER
[address], [address] [facility] [address]  Phone: [phone]  Fax: [phone]      July 25, 2019     [facility] [place] [id number]      Dear Karla Ko:    I am writing you because I have been informed of your missed appointment today 7/25/19 for your 1 month follow up & med check. We ask that you please call 24 hours in advance if you are unable to make your appointment so that we can give that time to another patient in need. We care about you and the management of your healthcare and want to make sure that you follow up as recommended. I have to also mention, that in an effort to provide quality care and timely appointments to all my patients, it is our policy that patients be discharged from the practice if they do not show for three scheduled appointments. You would be informed of this termination by mail, and would have 30 days from the date of discharge to locate another physician. We're sorry you were unable to keep your appointment and hope that you are doing well. We would like to continue treating your healthcare needs. Please call the office to let us know your plans for treatment and to reschedule your appointment.      Sincerely,        Sang Purvis MD

## 2019-07-25 NOTE — TELEPHONE ENCOUNTER
Patient called & same day cancelled appointment for today 7/25/19    LVM informing patient this was a follow up and med check. Left message on voicemail - NO REFILLS can be given at this time since you are due for a follow up. Informed this is also the patients second N SHOW with  & per [address] [place] [address] St 3 no shows are grounds for a dismissal from our practice.     Patient need call back & schedule ASAP    No Show letter ready to mail if not rescheduled by end of day

## 2019-07-29 ENCOUNTER — TELEPHONE (OUTPATIENT)
Dept: GASTROENTEROLOGY | Age: 44
End: 2019-07-29

## 2019-07-30 ENCOUNTER — ANESTHESIA EVENT (OUTPATIENT)
Dept: ENDOSCOPY | Age: 44
End: 2019-07-30
Payer: COMMERCIAL

## 2019-07-30 ENCOUNTER — HOSPITAL ENCOUNTER (OUTPATIENT)
Age: 44
Setting detail: OUTPATIENT SURGERY
Discharge: HOME | End: 2019-07-30
Attending: INTERNAL MEDICINE | Admitting: INTERNAL MEDICINE
Payer: COMMERCIAL

## 2019-07-30 ENCOUNTER — ANESTHESIA (OUTPATIENT)
Dept: ENDOSCOPY | Age: 44
End: 2019-07-30
Payer: COMMERCIAL

## 2019-07-30 VITALS
SYSTOLIC BLOOD PRESSURE: 107 MMHG | HEART RATE: 64 BPM | TEMPERATURE: 97.1 F | RESPIRATION RATE: 16 BRPM | OXYGEN SATURATION: 100 % | HEIGHT: 72 IN | WEIGHT: 293 LBS | BODY MASS INDEX: 39.68 KG/M2 | DIASTOLIC BLOOD PRESSURE: 65 MMHG

## 2019-07-30 VITALS
OXYGEN SATURATION: 95 % | SYSTOLIC BLOOD PRESSURE: 130 MMHG | RESPIRATION RATE: 22 BRPM | DIASTOLIC BLOOD PRESSURE: 54 MMHG

## 2019-07-30 LAB — PREGNANCY, URINE: NEGATIVE

## 2019-07-30 PROCEDURE — 3609027000 HC COLONOSCOPY: Performed by: INTERNAL MEDICINE

## 2019-07-30 PROCEDURE — 43235 EGD DIAGNOSTIC BRUSH WASH: CPT | Performed by: INTERNAL MEDICINE

## 2019-07-30 PROCEDURE — 84703 CHORIONIC GONADOTROPIN ASSAY: CPT

## 2019-07-30 PROCEDURE — 2580000003 HC RX 258: Performed by: NURSE ANESTHETIST, CERTIFIED REGISTERED

## 2019-07-30 PROCEDURE — 3700000001 HC ADD 15 MINUTES (ANESTHESIA): Performed by: INTERNAL MEDICINE

## 2019-07-30 PROCEDURE — 3609017100 HC EGD: Performed by: INTERNAL MEDICINE

## 2019-07-30 PROCEDURE — 7100000011 HC PHASE II RECOVERY - ADDTL 15 MIN: Performed by: INTERNAL MEDICINE

## 2019-07-30 PROCEDURE — 7100000010 HC PHASE II RECOVERY - FIRST 15 MIN: Performed by: INTERNAL MEDICINE

## 2019-07-30 PROCEDURE — 45378 DIAGNOSTIC COLONOSCOPY: CPT | Performed by: INTERNAL MEDICINE

## 2019-07-30 PROCEDURE — 6360000002 HC RX W HCPCS: Performed by: NURSE ANESTHETIST, CERTIFIED REGISTERED

## 2019-07-30 PROCEDURE — 3700000000 HC ANESTHESIA ATTENDED CARE: Performed by: INTERNAL MEDICINE

## 2019-07-30 PROCEDURE — 2500000003 HC RX 250 WO HCPCS: Performed by: NURSE ANESTHETIST, CERTIFIED REGISTERED

## 2019-07-30 PROCEDURE — 2709999900 HC NON-CHARGEABLE SUPPLY: Performed by: INTERNAL MEDICINE

## 2019-07-30 RX ORDER — SODIUM CHLORIDE, SODIUM LACTATE, POTASSIUM CHLORIDE, CALCIUM CHLORIDE 600; 310; 30; 20 MG/100ML; MG/100ML; MG/100ML; MG/100ML
INJECTION, SOLUTION INTRAVENOUS CONTINUOUS PRN
Status: DISCONTINUED | OUTPATIENT
Start: 2019-07-30 | End: 2019-07-30 | Stop reason: SDUPTHER

## 2019-07-30 RX ORDER — LIDOCAINE HYDROCHLORIDE 20 MG/ML
INJECTION, SOLUTION INFILTRATION; PERINEURAL PRN
Status: DISCONTINUED | OUTPATIENT
Start: 2019-07-30 | End: 2019-07-30 | Stop reason: SDUPTHER

## 2019-07-30 RX ORDER — SODIUM CHLORIDE, SODIUM LACTATE, POTASSIUM CHLORIDE, CALCIUM CHLORIDE 600; 310; 30; 20 MG/100ML; MG/100ML; MG/100ML; MG/100ML
INJECTION, SOLUTION INTRAVENOUS CONTINUOUS
Status: DISCONTINUED | OUTPATIENT
Start: 2019-07-30 | End: 2019-07-30 | Stop reason: HOSPADM

## 2019-07-30 RX ORDER — PROPOFOL 10 MG/ML
INJECTION, EMULSION INTRAVENOUS PRN
Status: DISCONTINUED | OUTPATIENT
Start: 2019-07-30 | End: 2019-07-30 | Stop reason: SDUPTHER

## 2019-07-30 RX ADMIN — SODIUM CHLORIDE, POTASSIUM CHLORIDE, SODIUM LACTATE AND CALCIUM CHLORIDE: 600; 310; 30; 20 INJECTION, SOLUTION INTRAVENOUS at 10:27

## 2019-07-30 RX ADMIN — PROPOFOL 100 MG: 10 INJECTION, EMULSION INTRAVENOUS at 10:38

## 2019-07-30 RX ADMIN — PROPOFOL 50 MG: 10 INJECTION, EMULSION INTRAVENOUS at 10:44

## 2019-07-30 RX ADMIN — PROPOFOL 50 MG: 10 INJECTION, EMULSION INTRAVENOUS at 10:47

## 2019-07-30 RX ADMIN — LIDOCAINE HYDROCHLORIDE 20 MG: 20 INJECTION, SOLUTION INFILTRATION; PERINEURAL at 10:29

## 2019-07-30 RX ADMIN — PROPOFOL 100 MG: 10 INJECTION, EMULSION INTRAVENOUS at 10:27

## 2019-07-30 RX ADMIN — PROPOFOL 50 MG: 10 INJECTION, EMULSION INTRAVENOUS at 10:32

## 2019-07-30 ASSESSMENT — COPD QUESTIONNAIRES: CAT_SEVERITY: MILD

## 2019-07-30 ASSESSMENT — PAIN - FUNCTIONAL ASSESSMENT: PAIN_FUNCTIONAL_ASSESSMENT: 0-10

## 2019-07-30 NOTE — H&P
tablet by mouth every 6 hours as needed (nausea)     Heartburn  -     EGD  -     metoclopramide (REGLAN) 10 MG tablet; Take 1 tablet by mouth every 6 hours as needed (nausea)        ORDERED FUTURE/PENDING TESTS      Lab Frequency Next Occurrence   Calprotectin, Fecal Once 05/17/2019   ECHO Complete 2D W Doppler W Color Once 06/29/2019   CT Chest WO Contrast Once 06/29/2019         FOLLOWUP   Return for EGD & Colonoscopy.       Te 40 7/30/19 10:22 AM

## 2019-08-01 ENCOUNTER — OFFICE VISIT (OUTPATIENT)
Dept: PULMONOLOGY | Age: 44
End: 2019-08-01
Payer: COMMERCIAL

## 2019-08-01 VITALS
DIASTOLIC BLOOD PRESSURE: 75 MMHG | HEART RATE: 77 BPM | BODY MASS INDEX: 39.68 KG/M2 | WEIGHT: 293 LBS | SYSTOLIC BLOOD PRESSURE: 125 MMHG | RESPIRATION RATE: 16 BRPM | OXYGEN SATURATION: 97 % | HEIGHT: 72 IN

## 2019-08-01 DIAGNOSIS — J41.0 CHRONIC BRONCHITIS, SIMPLE (HCC): Primary | ICD-10-CM

## 2019-08-01 DIAGNOSIS — F17.200 TOBACCO DEPENDENCE: ICD-10-CM

## 2019-08-01 PROCEDURE — G8926 SPIRO NO PERF OR DOC: HCPCS | Performed by: INTERNAL MEDICINE

## 2019-08-01 PROCEDURE — 4004F PT TOBACCO SCREEN RCVD TLK: CPT | Performed by: INTERNAL MEDICINE

## 2019-08-01 PROCEDURE — G8427 DOCREV CUR MEDS BY ELIG CLIN: HCPCS | Performed by: INTERNAL MEDICINE

## 2019-08-01 PROCEDURE — G8417 CALC BMI ABV UP PARAM F/U: HCPCS | Performed by: INTERNAL MEDICINE

## 2019-08-01 PROCEDURE — 99214 OFFICE O/P EST MOD 30 MIN: CPT | Performed by: INTERNAL MEDICINE

## 2019-08-01 PROCEDURE — 3023F SPIROM DOC REV: CPT | Performed by: INTERNAL MEDICINE

## 2019-08-01 ASSESSMENT — ENCOUNTER SYMPTOMS
DIARRHEA: 0
VOICE CHANGE: 0
STRIDOR: 0
CHEST TIGHTNESS: 0
CONSTIPATION: 0
EYE ITCHING: 0
CHOKING: 0
SORE THROAT: 0
EYE PAIN: 0
EYE DISCHARGE: 0
ABDOMINAL PAIN: 0

## 2019-08-01 NOTE — PATIENT INSTRUCTIONS
Remember to bring all pulmonary medications to your next appointment with the office. Please keep all of your future appointments scheduled by [address], [facility], located within [facility] office. Out of respect for other patients and providers, you may be asked to reschedule your appointment if you arrive later than your scheduled appointment time. Appointments cancelled less than 24hrs in advance will be considered a no show. Patients with three missed appointments within 1 year or four missed appointments within 2 years can be dismissed from the practice. You may receive a survey regarding the care you received during your visit. Your input is valuable to us. We encourage you to complete and return your survey. We hope you will choose us in the future for your healthcare needs.

## 2019-08-01 NOTE — PROGRESS NOTES
Pulmonary Outpatient Note   Mike Arroyo MD       8/1/2019    Chief Complaint:  Follow-up and Results (Ct)     HPI:   38y.o. year old female here for further evaluation of asthma. Had PFTs that showed mild obstruction and significant post bronchodilator improvement based on FVC criteria. Responsive to Symbicort and albuterol MDI, using the latter very frequently. Was changed to [facility] due to insurance coverage, not sure if this is working yet. Had allergic testing that was negative, subsequently referred to allergist to further clarify this. She has not yet been seen by them. No acute exacerbations since last visit. CT chest personally reviewed, unchanged nodules favoring benign process. Mild pretracheal lymphadenopathy also unchanged. Echo reviewed, no acute process    Past Medical History:   Diagnosis Date    Asthma     COPD with acute exacerbation ([address] 6/30/2015    Endometriosis     Fibromyalgia     Gastroparesis     GERD (gastroesophageal reflux disease)     Irritable bowel syndrome     Migraines     Osteoarthritis     Psychiatric problem     Thyroid disease        Past Surgical History:   Procedure Laterality Date    COLONOSCOPY  2006    IBS    COLONOSCOPY N/A 7/30/2019    COLON W/ANES. performed by Valentino Essex, MD at [place] [address], ESOPHAGUS      ENDOMETRIAL ABLATION      ENDOSCOPY, COLON, DIAGNOSTIC      SALPINGO-OOPHORECTOMY Left 2009    UPPER GASTROINTESTINAL ENDOSCOPY N/A 7/30/2019    EGD W/ANES.  (10:20) performed by Valentino Essex, MD at [facility]. [facility] History     Tobacco Use    Smoking status: Current Every Day Smoker     Packs/day: 1.00     Years: 20.00     Pack years: 20.00     Start date: 6/29/1995    Smokeless tobacco: Never Used   Substance Use Topics    Alcohol use: Not Currently          Family History   Problem Relation Age of Onset    Depression Mother     Mental Illness Mother     Cancer Mother is warm and dry. No rash noted. She is not diaphoretic. ASSESSMENT:    1. Chronic bronchitis, simple ([address]    2. Tobacco dependence      PLAN:    -Asthma with uncontrolled symptoms out of proportion to testing data despite aggressive treatment with an ICS therapy. Concern for alternate diagnosis. -Await further input from allergist and allergy testing. She may benefit from immunotherapy or dupilumab if identified.   -Continue bronchodilators in the interim.   -Counseled extensively on smoking cessation  -If allergic workup negative and she remains symptomatic will discuss bronchoscopy with ebus lymph node sampling to determine if occult sarcoidosis or other finding exists.          Estevan Hanson MD

## 2019-08-01 NOTE — PROGRESS NOTES
MA Communication: The following orders are received by verbal communication from Dr. Charlotte Bullock.     Orders include:  PRN fu

## 2019-08-15 ENCOUNTER — OFFICE VISIT (OUTPATIENT)
Dept: INTERNAL MEDICINE CLINIC | Age: 44
End: 2019-08-15
Payer: COMMERCIAL

## 2019-08-15 VITALS
HEIGHT: 72 IN | OXYGEN SATURATION: 98 % | DIASTOLIC BLOOD PRESSURE: 88 MMHG | WEIGHT: 293 LBS | BODY MASS INDEX: 39.68 KG/M2 | SYSTOLIC BLOOD PRESSURE: 134 MMHG | HEART RATE: 92 BPM

## 2019-08-15 DIAGNOSIS — F32.A ANXIETY AND DEPRESSION: ICD-10-CM

## 2019-08-15 DIAGNOSIS — J30.89 ENVIRONMENTAL AND SEASONAL ALLERGIES: ICD-10-CM

## 2019-08-15 DIAGNOSIS — E66.01 CLASS 3 SEVERE OBESITY DUE TO EXCESS CALORIES WITHOUT SERIOUS COMORBIDITY WITH BODY MASS INDEX (BMI) OF 45.0 TO 49.9 IN ADULT (HCC): ICD-10-CM

## 2019-08-15 DIAGNOSIS — R51.9 HEADACHE DISORDER: Primary | ICD-10-CM

## 2019-08-15 DIAGNOSIS — F41.9 ANXIETY AND DEPRESSION: ICD-10-CM

## 2019-08-15 PROCEDURE — G8427 DOCREV CUR MEDS BY ELIG CLIN: HCPCS | Performed by: INTERNAL MEDICINE

## 2019-08-15 PROCEDURE — 4004F PT TOBACCO SCREEN RCVD TLK: CPT | Performed by: INTERNAL MEDICINE

## 2019-08-15 PROCEDURE — G8417 CALC BMI ABV UP PARAM F/U: HCPCS | Performed by: INTERNAL MEDICINE

## 2019-08-15 PROCEDURE — 99214 OFFICE O/P EST MOD 30 MIN: CPT | Performed by: INTERNAL MEDICINE

## 2019-08-15 RX ORDER — SUMATRIPTAN 100 MG/1
100 TABLET, FILM COATED ORAL
Qty: 9 TABLET | Refills: 9 | Status: SHIPPED | OUTPATIENT
Start: 2019-08-15 | End: 2019-08-15

## 2019-08-15 RX ORDER — BUPROPION HYDROCHLORIDE 300 MG/1
300 TABLET ORAL EVERY MORNING
Qty: 30 TABLET | Refills: 9 | Status: SHIPPED | OUTPATIENT
Start: 2019-08-15 | End: 2020-07-09

## 2019-08-15 RX ORDER — TOPIRAMATE 100 MG/1
100 TABLET, FILM COATED ORAL 2 TIMES DAILY
Qty: 60 TABLET | Refills: 2 | Status: SHIPPED | OUTPATIENT
Start: 2019-08-15 | End: 2020-07-09

## 2019-08-15 NOTE — PROGRESS NOTES
Take 1 tablet by mouth every morning    Environmental and seasonal allergies    Class 3 severe obesity due to excess calories without serious comorbidity with body mass index (BMI) of 45.0 to 49.9 in adult [facility])    Goals:   -Eat 4-5 times daily  -Avoid high fat and high sugar foods  -Include protein with all meals and snacks  -Avoid carbonation and caffeine  -Avoid calorie containing beverages  -Increase physical activity as tolerated      Return 3 months headache, mood.

## 2019-09-27 ENCOUNTER — TELEPHONE (OUTPATIENT)
Dept: INTERNAL MEDICINE CLINIC | Age: 44
End: 2019-09-27

## 2019-09-30 DIAGNOSIS — M25.572 CHRONIC PAIN OF BOTH ANKLES: Primary | ICD-10-CM

## 2019-09-30 DIAGNOSIS — M25.571 CHRONIC PAIN OF BOTH ANKLES: Primary | ICD-10-CM

## 2019-09-30 DIAGNOSIS — G89.29 CHRONIC PAIN OF BOTH ANKLES: Primary | ICD-10-CM

## 2019-10-03 ENCOUNTER — OFFICE VISIT (OUTPATIENT)
Dept: ORTHOPEDIC SURGERY | Age: 44
End: 2019-10-03
Payer: COMMERCIAL

## 2019-10-03 VITALS — BODY MASS INDEX: 39.68 KG/M2 | WEIGHT: 293 LBS | HEIGHT: 72 IN

## 2019-10-03 DIAGNOSIS — G89.29 CHRONIC PAIN OF RIGHT KNEE: ICD-10-CM

## 2019-10-03 DIAGNOSIS — S82.832A CLOSED FRACTURE OF PROXIMAL END OF LEFT FIBULA, UNSPECIFIED FRACTURE MORPHOLOGY, INITIAL ENCOUNTER: ICD-10-CM

## 2019-10-03 DIAGNOSIS — S93.402A SPRAIN OF LEFT ANKLE, UNSPECIFIED LIGAMENT, INITIAL ENCOUNTER: Primary | ICD-10-CM

## 2019-10-03 DIAGNOSIS — M25.561 CHRONIC PAIN OF RIGHT KNEE: ICD-10-CM

## 2019-10-03 PROCEDURE — G8417 CALC BMI ABV UP PARAM F/U: HCPCS | Performed by: ORTHOPAEDIC SURGERY

## 2019-10-03 PROCEDURE — G8427 DOCREV CUR MEDS BY ELIG CLIN: HCPCS | Performed by: ORTHOPAEDIC SURGERY

## 2019-10-03 PROCEDURE — G8484 FLU IMMUNIZE NO ADMIN: HCPCS | Performed by: ORTHOPAEDIC SURGERY

## 2019-10-03 PROCEDURE — 99243 OFF/OP CNSLTJ NEW/EST LOW 30: CPT | Performed by: ORTHOPAEDIC SURGERY

## 2019-10-03 PROCEDURE — L4361 PNEUMA/VAC WALK BOOT PRE OTS: HCPCS | Performed by: ORTHOPAEDIC SURGERY

## 2019-10-03 RX ORDER — IBUPROFEN 800 MG/1
800 TABLET ORAL 3 TIMES DAILY PRN
Qty: 90 TABLET | Refills: 5 | Status: SHIPPED | OUTPATIENT
Start: 2019-10-03 | End: 2020-07-09

## 2019-10-04 ENCOUNTER — TELEPHONE (OUTPATIENT)
Dept: ORTHOPEDIC SURGERY | Age: 44
End: 2019-10-04

## 2019-11-12 DIAGNOSIS — R12 HEARTBURN: ICD-10-CM

## 2019-11-12 RX ORDER — OMEPRAZOLE 40 MG/1
CAPSULE, DELAYED RELEASE ORAL
Qty: 60 CAPSULE | Refills: 3 | Status: SHIPPED | OUTPATIENT
Start: 2019-11-12

## 2019-12-27 ENCOUNTER — OFFICE VISIT (OUTPATIENT)
Dept: ORTHOPEDIC SURGERY | Age: 44
End: 2019-12-27
Payer: COMMERCIAL

## 2019-12-27 VITALS — WEIGHT: 293 LBS | BODY MASS INDEX: 39.68 KG/M2 | HEIGHT: 72 IN

## 2019-12-27 DIAGNOSIS — S82.832S: ICD-10-CM

## 2019-12-27 DIAGNOSIS — M79.605 PAIN OF LEFT LOWER EXTREMITY: Primary | ICD-10-CM

## 2019-12-27 PROCEDURE — G8427 DOCREV CUR MEDS BY ELIG CLIN: HCPCS | Performed by: PHYSICIAN ASSISTANT

## 2019-12-27 PROCEDURE — 4004F PT TOBACCO SCREEN RCVD TLK: CPT | Performed by: PHYSICIAN ASSISTANT

## 2019-12-27 PROCEDURE — G8417 CALC BMI ABV UP PARAM F/U: HCPCS | Performed by: PHYSICIAN ASSISTANT

## 2019-12-27 PROCEDURE — 99213 OFFICE O/P EST LOW 20 MIN: CPT | Performed by: PHYSICIAN ASSISTANT

## 2019-12-27 PROCEDURE — G8484 FLU IMMUNIZE NO ADMIN: HCPCS | Performed by: PHYSICIAN ASSISTANT

## 2020-03-20 RX ORDER — BUDESONIDE AND FORMOTEROL FUMARATE DIHYDRATE 160; 4.5 UG/1; UG/1
2 AEROSOL RESPIRATORY (INHALATION) 2 TIMES DAILY
Qty: 1 INHALER | Refills: 5 | Status: SHIPPED | OUTPATIENT
Start: 2020-03-20

## 2020-03-20 RX ORDER — ALBUTEROL SULFATE 90 UG/1
AEROSOL, METERED RESPIRATORY (INHALATION)
Qty: 1 INHALER | Refills: 5 | Status: SHIPPED | OUTPATIENT
Start: 2020-03-20

## 2020-07-09 ENCOUNTER — VIRTUAL VISIT (OUTPATIENT)
Dept: INTERNAL MEDICINE CLINIC | Age: 45
End: 2020-07-09

## 2020-07-09 PROCEDURE — 99442 PR PHYS/QHP TELEPHONE EVALUATION 11-20 MIN: CPT | Performed by: INTERNAL MEDICINE

## 2020-07-09 RX ORDER — CITALOPRAM 40 MG/1
40 TABLET ORAL DAILY
Qty: 30 TABLET | Refills: 1 | Status: SHIPPED | OUTPATIENT
Start: 2020-07-09

## 2020-07-09 NOTE — PROGRESS NOTES
Date of Service:  7/9/2020    Chief Complaint:      Chief Complaint   Patient presents with    Medication Check       HPI:  Belen Pickett is a 54 y.o. Belen Pickett is a 54 y.o. female evaluated via telephone on 7/9/2020 due to [employer] outbreak. Consent:  She and/or health care decision maker is aware that that she may receive a bill for this telephone service, depending on her insurance coverage, and has provided verbal consent to proceed: I AFFIRM/DO this is a Patient Initiated Episode with an Established Patient who has not had a related appointment within my department in the past 7 days or scheduled within the next 24 hours. Migraine Headache: improved to once a week during allergy season in the summer. Stable off Topamax 100 mg bid and Imitrex 100 mg prn. She does get nauseas and photophobia with headaches. Anxiety/Depression:  stable on Celexa 40 mg qd and have not need the Wellbutrin  mg qd.   Depression due to downturn in her health and chronic arthritis pain.  She's been on Celexa for a long time from previous PCP. Allergies:  Still having allergy symptoms despite being on Flonase, Xyzal and Singulair.  She just got referred to an allergist per Dr. Rogelio Zaidi  GERD:  Still some heartburn that results in nausea 5 days a week despite being back on Prilosec 40 mg qd per GI.    Bilateral knee, ankles and feet for the past 10 years.  Have improved on Mobic 15 mg qd but GI wants her off and use ibuprofen instead.  Had negative w/u for RA since FH RA.   Diarrhea stable off Bentyl 10 mg bid with lunch and dinner.     Recurrent oral HSV:  Been on acyclovir 400 mg qd as a prevention    Total Time: minutes: 11-20 minutes      Lab Results   Component Value Date    LABA1C 5.1 05/17/2019    LABMICR Yes 06/29/2015     Lab Results   Component Value Date     05/06/2019    K 3.5 05/06/2019     05/06/2019    CO2 24 05/06/2019    BUN 15 05/06/2019    CREATININE 1.0 05/06/2019    GLUCOSE 117 (H) 2019    CALCIUM 8.8 2019     Lab Results   Component Value Date    CHOL 169 2019    TRIG 254 2019    HDL 52 2019    LDLCALC 66 2019     Lab Results   Component Value Date    ALT 22 2019    AST 13 (L) 2019     Lab Results   Component Value Date    TSH 1.03 2019    T4FREE 1.1 2019     Lab Results   Component Value Date    WBC 9.7 2019    HGB 13.4 2019    HCT 38.2 2019    MCV 94.4 2019     2019     Lab Results   Component Value Date    INR 0.88 2014      No results found for: PSA   No results found for: [address]     Patient Active Problem List   Diagnosis    COPD with acute exacerbation ([facility]    Class 3 severe obesity due to excess calories without serious comorbidity with body mass index (BMI) of 45.0 to 49.9 in adult ([facility]    Smoker    Asthma    Primary osteoarthritis involving multiple joints    Environmental and seasonal allergies    Gastroesophageal reflux disease without esophagitis    Irritable bowel syndrome with diarrhea    Anxiety and depression    Rotator cuff tendinitis, left    Chronic pain of both knees    Primary HSV infection of mouth    Heartburn    Rectal bleeding       Allergies   Allergen Reactions    Compazine [Prochlorperazine] Anaphylaxis    Iodides Hives, Itching and Anaphylaxis     Per patient      Iodine      Other reaction(s): Respiratory distress    Ketorolac Tromethamine Hives     Outpatient Medications Marked as Taking for the 20 encounter (Virtual Visit) with Julia Medina MD   Medication Sig Dispense Refill    albuterol sulfate HFA (PROAIR HFA) 108 (90 Base) MCG/ACT inhaler Use 1-2 puffs every 4 hours PRN for wheezing and SOB 1 Inhaler 5    budesonide-formoterol (SYMBICORT) 160-4.5 MCG/ACT AERO Inhale 2 puffs into the lungs 2 times daily Y  PCN:CN  TTP:[id number]  QE:[phone] ($0 Guarantee (up to $100)) 1 Inhaler 5    omeprazole (PRILOSEC) 40 MG delayed release capsule TAKE ONE CAPSULE BY MOUTH TWICE A DAY 60 capsule 3    citalopram (CELEXA) 40 MG tablet Take 1 tablet by mouth daily 30 tablet 10         Review of Systems: 14 systems were negative except of what was stated on HPI    Nursing note and vitals reviewed. There were no vitals filed for this visit. Wt Readings from Last 3 Encounters:   12/27/19 (!) 311 lb (141.1 kg)   10/03/19 (!) 317 lb 14.5 oz (144.2 kg)   08/15/19 (!) 318 lb (144.2 kg)     BP Readings from Last 3 Encounters:   08/15/19 134/88   08/01/19 125/75   07/30/19 107/65     There is no height or weight on file to calculate BMI. Constitutional: Patient sounded well and in no distress. Psychiatric: Normal mood on the phone. Assessment/Plan:  [facility] was seen today for medication check. Diagnoses and all orders for this visit:    Anxiety and depression  -     citalopram (CELEXA) 40 MG tablet;  Take 1 tablet by mouth daily        Return Aug 13 at 11 Fasting Physical.

## 2021-07-12 ENCOUNTER — TELEPHONE (OUTPATIENT)
Dept: INTERNAL MEDICINE CLINIC | Age: 46
End: 2021-07-12

## 2021-07-12 NOTE — TELEPHONE ENCOUNTER
LILIAN to [facility] SURGICAL SPECIALTY [facility] for [facility] on 7.12.2021.

## (undated) DEVICE — ELECTRODE ECG MONITR FOAM TEAR DROP ADLT RED

## (undated) DEVICE — ENDO CARRY-ON PROCEDURE KIT INCLUDES SUCTION TUBING, LUBRICANT, GAUZE, BIOHAZARD STICKER, TRANSPORT PAD AND INTERCEPT BEDSIDE KIT.: Brand: ENDO CARRY-ON PROCEDURE KIT

## (undated) DEVICE — CONMED SCOPE SAVER BITE BLOCK, 20X27 MM: Brand: SCOPE SAVER